# Patient Record
Sex: MALE | Race: WHITE | NOT HISPANIC OR LATINO | Employment: PART TIME | URBAN - METROPOLITAN AREA
[De-identification: names, ages, dates, MRNs, and addresses within clinical notes are randomized per-mention and may not be internally consistent; named-entity substitution may affect disease eponyms.]

---

## 2017-01-25 ENCOUNTER — HOSPITAL ENCOUNTER (OUTPATIENT)
Dept: ONCOLOGY | Facility: CLINIC | Age: 57
Discharge: HOME | End: 2017-01-25
Attending: INTERNAL MEDICINE | Admitting: INTERNAL MEDICINE

## 2017-02-22 ENCOUNTER — HOSPITAL ENCOUNTER (OUTPATIENT)
Dept: ONCOLOGY | Facility: CLINIC | Age: 57
Discharge: HOME | End: 2017-02-22
Attending: INTERNAL MEDICINE | Admitting: INTERNAL MEDICINE

## 2017-03-22 ENCOUNTER — HOSPITAL ENCOUNTER (OUTPATIENT)
Dept: ONCOLOGY | Facility: CLINIC | Age: 57
Discharge: HOME | End: 2017-03-22
Attending: INTERNAL MEDICINE | Admitting: INTERNAL MEDICINE

## 2017-03-22 LAB
ALBUMIN SERPL-MCNC: 3.6 G/DL (ref 3.5–4.8)
ALBUMIN/GLOB SERPL: 1.6 {RATIO} (ref 1–1.7)
ALP SERPL-CCNC: 80 IU/L (ref 32–91)
ALT SERPL-CCNC: 47 IU/L (ref 17–63)
ANION GAP SERPL CALC-SCNC: 14.3 MMOL/L (ref 10–20)
AST SERPL-CCNC: 38 IU/L (ref 15–41)
BILIRUB SERPL-MCNC: 0.6 MG/DL (ref 0.3–1.2)
BUN SERPL-MCNC: 17 MG/DL (ref 8–20)
BUN/CREAT SERPL: 18.9 (ref 6.2–20.3)
CALCIUM SERPL-MCNC: 9.1 MG/DL (ref 8.9–10.3)
CEA SERPL-MCNC: NORMAL NG/ML (ref 0–5)
CHLORIDE SERPL-SCNC: 105 MMOL/L (ref 101–111)
CONV CO2: 26 MMOL/L (ref 22–32)
CONV TOTAL PROTEIN: 5.8 G/DL (ref 6.1–7.9)
CREAT UR-MCNC: 0.9 MG/DL (ref 0.7–1.2)
FERRITIN SERPL-MCNC: 18 NG/ML (ref 24–336)
GLOBULIN UR ELPH-MCNC: 2.2 G/DL (ref 2.5–3.8)
GLUCOSE SERPL-MCNC: 84 MG/DL (ref 65–99)
POTASSIUM SERPL-SCNC: 4.3 MMOL/L (ref 3.6–5.1)
SODIUM SERPL-SCNC: 141 MMOL/L (ref 136–144)

## 2017-04-20 ENCOUNTER — HOSPITAL ENCOUNTER (OUTPATIENT)
Dept: ONCOLOGY | Facility: CLINIC | Age: 57
Discharge: HOME | End: 2017-04-20
Attending: INTERNAL MEDICINE | Admitting: INTERNAL MEDICINE

## 2017-04-25 ENCOUNTER — HOSPITAL ENCOUNTER (OUTPATIENT)
Dept: ONCOLOGY | Facility: CLINIC | Age: 57
Discharge: HOME | End: 2017-04-25
Attending: INTERNAL MEDICINE | Admitting: INTERNAL MEDICINE

## 2017-05-09 ENCOUNTER — HOSPITAL ENCOUNTER (OUTPATIENT)
Dept: GENERAL RADIOLOGY | Facility: HOSPITAL | Age: 57
Discharge: HOME | End: 2017-05-09
Attending: FAMILY MEDICINE | Admitting: FAMILY MEDICINE

## 2017-05-17 ENCOUNTER — HOSPITAL ENCOUNTER (OUTPATIENT)
Dept: ONCOLOGY | Facility: CLINIC | Age: 57
Discharge: HOME | End: 2017-05-17
Attending: INTERNAL MEDICINE | Admitting: INTERNAL MEDICINE

## 2017-06-14 ENCOUNTER — HOSPITAL ENCOUNTER (OUTPATIENT)
Dept: ONCOLOGY | Facility: CLINIC | Age: 57
Discharge: HOME | End: 2017-06-14
Attending: INTERNAL MEDICINE | Admitting: INTERNAL MEDICINE

## 2017-07-18 ENCOUNTER — HOSPITAL ENCOUNTER (OUTPATIENT)
Dept: ONCOLOGY | Facility: CLINIC | Age: 57
Discharge: HOME | End: 2017-07-18
Attending: NURSE PRACTITIONER | Admitting: NURSE PRACTITIONER

## 2017-07-18 LAB
ANION GAP SERPL CALC-SCNC: 12.3 MMOL/L (ref 10–20)
BUN SERPL-MCNC: 21 MG/DL (ref 8–20)
BUN/CREAT SERPL: 23.3 (ref 6.2–20.3)
CALCIUM SERPL-MCNC: 9.3 MG/DL (ref 8.9–10.3)
CHLORIDE SERPL-SCNC: 102 MMOL/L (ref 101–111)
CONV CO2: 27 MMOL/L (ref 22–32)
CREAT UR-MCNC: 0.9 MG/DL (ref 0.7–1.2)
GLUCOSE SERPL-MCNC: 106 MG/DL (ref 65–99)
POTASSIUM SERPL-SCNC: 4.3 MMOL/L (ref 3.6–5.1)
SODIUM SERPL-SCNC: 137 MMOL/L (ref 136–144)

## 2017-07-24 ENCOUNTER — HOSPITAL ENCOUNTER (OUTPATIENT)
Dept: ONCOLOGY | Facility: CLINIC | Age: 57
Discharge: HOME | End: 2017-07-24
Attending: INTERNAL MEDICINE | Admitting: INTERNAL MEDICINE

## 2017-08-21 ENCOUNTER — HOSPITAL ENCOUNTER (OUTPATIENT)
Dept: ONCOLOGY | Facility: CLINIC | Age: 57
Discharge: HOME | End: 2017-08-21
Attending: INTERNAL MEDICINE | Admitting: INTERNAL MEDICINE

## 2017-09-06 ENCOUNTER — HOSPITAL ENCOUNTER (OUTPATIENT)
Dept: GENERAL RADIOLOGY | Facility: HOSPITAL | Age: 57
Discharge: HOME | End: 2017-09-06
Attending: FAMILY MEDICINE | Admitting: FAMILY MEDICINE

## 2017-09-18 ENCOUNTER — HOSPITAL ENCOUNTER (OUTPATIENT)
Dept: ONCOLOGY | Facility: HOSPITAL | Age: 57
Discharge: HOME | End: 2017-09-18
Attending: INTERNAL MEDICINE | Admitting: INTERNAL MEDICINE

## 2017-09-18 ENCOUNTER — HOSPITAL ENCOUNTER (OUTPATIENT)
Dept: ONCOLOGY | Facility: CLINIC | Age: 57
Setting detail: INFUSION SERIES
Discharge: HOME | End: 2017-09-18
Attending: INTERNAL MEDICINE | Admitting: INTERNAL MEDICINE

## 2017-09-18 ENCOUNTER — CLINICAL SUPPORT (OUTPATIENT)
Dept: ONCOLOGY | Facility: HOSPITAL | Age: 57
End: 2017-09-18

## 2017-09-18 NOTE — PROGRESS NOTES
PATIENTS ONCOLOGY RECORD LOCATED IN [facility]      Subjective     Name:  MYRANDA SPRINGER     Date:  2017  Address:  [address] [place] [address]  Home: [phone]  :  1960 AGE:  57 y.o.        RECORDS OBTAINED:  Patients Oncology Record is located in [facility]

## 2017-10-18 ENCOUNTER — HOSPITAL ENCOUNTER (OUTPATIENT)
Dept: ONCOLOGY | Facility: CLINIC | Age: 57
Setting detail: INFUSION SERIES
Discharge: HOME | End: 2017-10-18
Attending: INTERNAL MEDICINE | Admitting: INTERNAL MEDICINE

## 2017-10-18 ENCOUNTER — CLINICAL SUPPORT (OUTPATIENT)
Dept: ONCOLOGY | Facility: HOSPITAL | Age: 57
End: 2017-10-18

## 2017-10-18 ENCOUNTER — HOSPITAL ENCOUNTER (OUTPATIENT)
Dept: ONCOLOGY | Facility: HOSPITAL | Age: 57
Discharge: HOME | End: 2017-10-18
Attending: INTERNAL MEDICINE | Admitting: INTERNAL MEDICINE

## 2017-10-18 NOTE — PROGRESS NOTES
PATIENTS ONCOLOGY RECORD LOCATED IN [facility]      Subjective     Name:  MYRANDA SPRINGER     Date:  10/18/2017  Address:  [address] [place] [address]  Home: [phone]  :  1960 AGE:  57 y.o.        RECORDS OBTAINED:  Patients Oncology Record is located in [facility]

## 2017-11-20 ENCOUNTER — HOSPITAL ENCOUNTER (OUTPATIENT)
Dept: ONCOLOGY | Facility: CLINIC | Age: 57
Setting detail: INFUSION SERIES
Discharge: HOME | End: 2017-11-20
Attending: INTERNAL MEDICINE | Admitting: INTERNAL MEDICINE

## 2017-11-20 ENCOUNTER — CLINICAL SUPPORT (OUTPATIENT)
Dept: ONCOLOGY | Facility: HOSPITAL | Age: 57
End: 2017-11-20

## 2017-11-20 ENCOUNTER — HOSPITAL ENCOUNTER (OUTPATIENT)
Dept: ONCOLOGY | Facility: HOSPITAL | Age: 57
Discharge: HOME | End: 2017-11-20
Attending: INTERNAL MEDICINE | Admitting: INTERNAL MEDICINE

## 2017-11-20 LAB
ALBUMIN SERPL-MCNC: 4 G/DL (ref 3.5–4.8)
ALBUMIN/GLOB SERPL: 1.6 {RATIO} (ref 1–1.7)
ALP SERPL-CCNC: 71 IU/L (ref 32–91)
ALT SERPL-CCNC: 26 IU/L (ref 17–63)
ANION GAP SERPL CALC-SCNC: 11.4 MMOL/L (ref 10–20)
AST SERPL-CCNC: 27 IU/L (ref 15–41)
BILIRUB SERPL-MCNC: 1 MG/DL (ref 0.3–1.2)
BUN SERPL-MCNC: 18 MG/DL (ref 8–20)
BUN/CREAT SERPL: 22.5 (ref 6.2–20.3)
CALCIUM SERPL-MCNC: 9.2 MG/DL (ref 8.9–10.3)
CEA SERPL-MCNC: NORMAL NG/ML (ref 0–5)
CHLORIDE SERPL-SCNC: 101 MMOL/L (ref 101–111)
CONV CO2: 27 MMOL/L (ref 22–32)
CONV TOTAL PROTEIN: 6.5 G/DL (ref 6.1–7.9)
CREAT UR-MCNC: 0.8 MG/DL (ref 0.7–1.2)
FERRITIN SERPL-MCNC: 48 NG/ML (ref 24–336)
GLOBULIN UR ELPH-MCNC: 2.5 G/DL (ref 2.5–3.8)
GLUCOSE SERPL-MCNC: 108 MG/DL (ref 65–99)
POTASSIUM SERPL-SCNC: 4.4 MMOL/L (ref 3.6–5.1)
SODIUM SERPL-SCNC: 135 MMOL/L (ref 136–144)

## 2017-11-20 NOTE — PROGRESS NOTES
PATIENTS ONCOLOGY RECORD LOCATED IN [facility]      Subjective     Name:  MYRANDA SPRINGER     Date:  2017  Address:  [address] [place] [address]  Home: [phone]  :  1960 AGE:  57 y.o.        RECORDS OBTAINED:  Patients Oncology Record is located in [facility]

## 2017-12-20 ENCOUNTER — HOSPITAL ENCOUNTER (OUTPATIENT)
Dept: ONCOLOGY | Facility: CLINIC | Age: 57
Setting detail: INFUSION SERIES
Discharge: HOME | End: 2017-12-20
Attending: INTERNAL MEDICINE | Admitting: INTERNAL MEDICINE

## 2017-12-20 ENCOUNTER — CLINICAL SUPPORT (OUTPATIENT)
Dept: ONCOLOGY | Facility: HOSPITAL | Age: 57
End: 2017-12-20

## 2017-12-20 ENCOUNTER — HOSPITAL ENCOUNTER (OUTPATIENT)
Dept: ONCOLOGY | Facility: HOSPITAL | Age: 57
Discharge: HOME | End: 2017-12-20
Attending: INTERNAL MEDICINE | Admitting: INTERNAL MEDICINE

## 2017-12-20 NOTE — PROGRESS NOTES
PATIENTS ONCOLOGY RECORD LOCATED IN [facility]      Subjective     Name:  MYRANDA SPRINGER     Date:  2017  Address:  [address] [place] [address]  Home: [phone]  :  1960 AGE:  57 y.o.        RECORDS OBTAINED:  Patients Oncology Record is located in [facility]

## 2018-01-15 ENCOUNTER — CLINICAL SUPPORT (OUTPATIENT)
Dept: ONCOLOGY | Facility: HOSPITAL | Age: 58
End: 2018-01-15

## 2018-01-15 ENCOUNTER — HOSPITAL ENCOUNTER (OUTPATIENT)
Dept: ONCOLOGY | Facility: CLINIC | Age: 58
Setting detail: INFUSION SERIES
Discharge: HOME | End: 2018-01-15
Attending: INTERNAL MEDICINE | Admitting: INTERNAL MEDICINE

## 2018-01-15 NOTE — PROGRESS NOTES
PATIENTS ONCOLOGY RECORD LOCATED IN [facility]      Subjective     Name:  MYRANDA SPRINGER     Date:  01/15/2018  Address:  [address] [place] [address]  Home: [phone]  :  1960 AGE:  58 y.o.        RECORDS OBTAINED:  Patients Oncology Record is located in [facility]

## 2018-02-19 ENCOUNTER — HOSPITAL ENCOUNTER (OUTPATIENT)
Dept: ONCOLOGY | Facility: CLINIC | Age: 58
Setting detail: INFUSION SERIES
Discharge: HOME | End: 2018-02-19
Attending: INTERNAL MEDICINE | Admitting: INTERNAL MEDICINE

## 2018-02-19 ENCOUNTER — CLINICAL SUPPORT (OUTPATIENT)
Dept: ONCOLOGY | Facility: HOSPITAL | Age: 58
End: 2018-02-19

## 2018-02-19 NOTE — PROGRESS NOTES
PATIENTS ONCOLOGY RECORD LOCATED IN [facility]      Subjective     Name:  MYRANDA SPRINGER     Date:  2018  Address:  [address] [place] [address]  Home: [phone]  :  1960 AGE:  58 y.o.        RECORDS OBTAINED:  Patients Oncology Record is located in [facility]

## 2018-03-19 ENCOUNTER — HOSPITAL ENCOUNTER (OUTPATIENT)
Dept: ONCOLOGY | Facility: HOSPITAL | Age: 58
Discharge: HOME | End: 2018-03-19
Attending: NURSE PRACTITIONER | Admitting: NURSE PRACTITIONER

## 2018-03-19 ENCOUNTER — CLINICAL SUPPORT (OUTPATIENT)
Dept: ONCOLOGY | Facility: HOSPITAL | Age: 58
End: 2018-03-19

## 2018-03-19 ENCOUNTER — HOSPITAL ENCOUNTER (OUTPATIENT)
Dept: ONCOLOGY | Facility: CLINIC | Age: 58
Setting detail: INFUSION SERIES
Discharge: HOME | End: 2018-03-19
Attending: NURSE PRACTITIONER | Admitting: NURSE PRACTITIONER

## 2018-03-19 NOTE — PROGRESS NOTES
PATIENTS ONCOLOGY RECORD LOCATED IN [facility]      Subjective     Name:  MYRANDA SPRINGER     Date:  2018  Address:  [address] [place] [address]  Home: [phone]  :  1960 AGE:  58 y.o.        RECORDS OBTAINED:  Patients Oncology Record is located in [facility]

## 2018-04-17 ENCOUNTER — HOSPITAL ENCOUNTER (OUTPATIENT)
Dept: ONCOLOGY | Facility: CLINIC | Age: 58
Setting detail: INFUSION SERIES
Discharge: HOME | End: 2018-04-17
Attending: INTERNAL MEDICINE | Admitting: INTERNAL MEDICINE

## 2018-04-17 ENCOUNTER — CLINICAL SUPPORT (OUTPATIENT)
Dept: ONCOLOGY | Facility: HOSPITAL | Age: 58
End: 2018-04-17

## 2018-04-17 NOTE — PROGRESS NOTES
PATIENTS ONCOLOGY RECORD LOCATED IN [facility]      Subjective     Name:  MYRANDA SPRINGER     Date:  2018  Address:  [address] [place] [address]  Home: [phone]  :  1960 AGE:  58 y.o.        RECORDS OBTAINED:  Patients Oncology Record is located in [facility]

## 2018-05-16 ENCOUNTER — HOSPITAL ENCOUNTER (OUTPATIENT)
Dept: ONCOLOGY | Facility: CLINIC | Age: 58
Setting detail: INFUSION SERIES
Discharge: HOME | End: 2018-05-16
Attending: INTERNAL MEDICINE | Admitting: INTERNAL MEDICINE

## 2018-05-16 ENCOUNTER — CLINICAL SUPPORT (OUTPATIENT)
Dept: ONCOLOGY | Facility: HOSPITAL | Age: 58
End: 2018-05-16

## 2018-05-16 NOTE — PROGRESS NOTES
PATIENTS ONCOLOGY RECORD LOCATED IN [facility]      Subjective     Name:  MYRANDA SPRINGER     Date:  2018  Address:  [address] [place] [address]  Home: [phone]  :  1960 AGE:  58 y.o.        RECORDS OBTAINED:  Patients Oncology Record is located in [facility]

## 2018-06-19 ENCOUNTER — HOSPITAL ENCOUNTER (OUTPATIENT)
Dept: ONCOLOGY | Facility: CLINIC | Age: 58
Setting detail: INFUSION SERIES
Discharge: HOME | End: 2018-06-19
Attending: INTERNAL MEDICINE | Admitting: INTERNAL MEDICINE

## 2018-06-19 ENCOUNTER — CLINICAL SUPPORT (OUTPATIENT)
Dept: ONCOLOGY | Facility: HOSPITAL | Age: 58
End: 2018-06-19

## 2018-06-19 NOTE — PROGRESS NOTES
PATIENTS ONCOLOGY RECORD LOCATED IN [facility]      Subjective     Name:  MYRANDA SPRINGER     Date:  2018  Address:  [address] [place] [address]  Home: [phone]  :  1960 AGE:  58 y.o.        RECORDS OBTAINED:  Patients Oncology Record is located in [facility]

## 2018-07-16 ENCOUNTER — HOSPITAL ENCOUNTER (OUTPATIENT)
Dept: ONCOLOGY | Facility: CLINIC | Age: 58
Setting detail: INFUSION SERIES
Discharge: HOME | End: 2018-07-16
Attending: INTERNAL MEDICINE | Admitting: INTERNAL MEDICINE

## 2018-07-16 ENCOUNTER — HOSPITAL ENCOUNTER (OUTPATIENT)
Dept: ONCOLOGY | Facility: HOSPITAL | Age: 58
Discharge: HOME | End: 2018-07-16
Attending: INTERNAL MEDICINE | Admitting: INTERNAL MEDICINE

## 2018-07-16 ENCOUNTER — CLINICAL SUPPORT (OUTPATIENT)
Dept: ONCOLOGY | Facility: HOSPITAL | Age: 58
End: 2018-07-16

## 2018-07-16 LAB
ALBUMIN SERPL-MCNC: 4 G/DL (ref 3.5–4.8)
ALBUMIN/GLOB SERPL: 1.4 {RATIO} (ref 1–1.7)
ALP SERPL-CCNC: 70 IU/L (ref 32–91)
ALT SERPL-CCNC: 20 IU/L (ref 17–63)
ANION GAP SERPL CALC-SCNC: 11.2 MMOL/L (ref 10–20)
AST SERPL-CCNC: 27 IU/L (ref 15–41)
BILIRUB SERPL-MCNC: 0.6 MG/DL (ref 0.3–1.2)
BUN SERPL-MCNC: 18 MG/DL (ref 8–20)
BUN/CREAT SERPL: 20 (ref 6.2–20.3)
CALCIUM SERPL-MCNC: 9.4 MG/DL (ref 8.9–10.3)
CHLORIDE SERPL-SCNC: 102 MMOL/L (ref 101–111)
CONV CO2: 28 MMOL/L (ref 22–32)
CONV TOTAL PROTEIN: 6.8 G/DL (ref 6.1–7.9)
CREAT UR-MCNC: 0.9 MG/DL (ref 0.7–1.2)
GLOBULIN UR ELPH-MCNC: 2.8 G/DL (ref 2.5–3.8)
GLUCOSE SERPL-MCNC: 82 MG/DL (ref 65–99)
POTASSIUM SERPL-SCNC: 4.2 MMOL/L (ref 3.6–5.1)
SODIUM SERPL-SCNC: 137 MMOL/L (ref 136–144)

## 2018-07-16 NOTE — PROGRESS NOTES
PATIENTS ONCOLOGY RECORD LOCATED IN [facility]      Subjective     Name:  MYRANDA SPRINGER     Date:  2018  Address:  [address] [place] [address]  Home: [phone]  :  1960 AGE:  58 y.o.        RECORDS OBTAINED:  Patients Oncology Record is located in [facility]

## 2018-08-13 ENCOUNTER — HOSPITAL ENCOUNTER (OUTPATIENT)
Dept: ONCOLOGY | Facility: CLINIC | Age: 58
Setting detail: INFUSION SERIES
Discharge: HOME | End: 2018-08-13
Attending: INTERNAL MEDICINE | Admitting: INTERNAL MEDICINE

## 2018-08-13 ENCOUNTER — CLINICAL SUPPORT (OUTPATIENT)
Dept: ONCOLOGY | Facility: HOSPITAL | Age: 58
End: 2018-08-13

## 2018-08-13 NOTE — PROGRESS NOTES
PATIENTS ONCOLOGY RECORD LOCATED IN [facility]      Subjective     Name:  MYRANDA SPRINGER     Date:  2018  Address:  [address] [place] [address]  Home: [phone]  :  1960 AGE:  58 y.o.        RECORDS OBTAINED:  Patients Oncology Record is located in [facility]

## 2018-09-04 ENCOUNTER — CONVERSION ENCOUNTER (OUTPATIENT)
Dept: SURGERY | Facility: CLINIC | Age: 58
End: 2018-09-04

## 2018-09-04 ENCOUNTER — HOSPITAL ENCOUNTER (OUTPATIENT)
Dept: CT IMAGING | Facility: HOSPITAL | Age: 58
Discharge: HOME | End: 2018-09-04
Attending: THORACIC SURGERY (CARDIOTHORACIC VASCULAR SURGERY) | Admitting: THORACIC SURGERY (CARDIOTHORACIC VASCULAR SURGERY)

## 2018-09-04 LAB — CREAT BLDA-MCNC: 0.9 MG/DL (ref 0.6–1.3)

## 2018-09-10 ENCOUNTER — HOSPITAL ENCOUNTER (OUTPATIENT)
Dept: PREOP | Facility: HOSPITAL | Age: 58
Setting detail: HOSPITAL OUTPATIENT SURGERY
Discharge: HOME | End: 2018-09-10
Attending: THORACIC SURGERY (CARDIOTHORACIC VASCULAR SURGERY) | Admitting: THORACIC SURGERY (CARDIOTHORACIC VASCULAR SURGERY)

## 2018-09-10 LAB
BASOPHILS # BLD AUTO: 0 10*3/UL (ref 0–0.2)
BASOPHILS NFR BLD AUTO: 1 % (ref 0–2)
DIFFERENTIAL METHOD BLD: (no result)
EOSINOPHIL # BLD AUTO: 0.2 10*3/UL (ref 0–0.3)
EOSINOPHIL # BLD AUTO: 4 % (ref 0–3)
ERYTHROCYTE [DISTWIDTH] IN BLOOD BY AUTOMATED COUNT: 13.6 % (ref 11.5–14.5)
HCT VFR BLD AUTO: 47.2 % (ref 40–54)
HGB BLD-MCNC: 15.6 G/DL (ref 14–18)
LYMPHOCYTES # BLD AUTO: 0.9 10*3/UL (ref 0.8–4.8)
LYMPHOCYTES NFR BLD AUTO: 22 % (ref 18–42)
MCH RBC QN AUTO: 29.2 PG (ref 26–32)
MCHC RBC AUTO-ENTMCNC: 33 G/DL (ref 32–36)
MCV RBC AUTO: 88.3 FL (ref 80–94)
MONOCYTES # BLD AUTO: 0.6 10*3/UL (ref 0.1–1.3)
MONOCYTES NFR BLD AUTO: 14 % (ref 2–11)
NEUTROPHILS # BLD AUTO: 2.3 10*3/UL (ref 2.3–8.6)
NEUTROPHILS NFR BLD AUTO: 59 % (ref 50–75)
NRBC BLD AUTO-RTO: 0 /100{WBCS}
NRBC/RBC NFR BLD MANUAL: 0 10*3/UL
PLATELET # BLD AUTO: 253 10*3/UL (ref 150–450)
PMV BLD AUTO: 7.5 FL (ref 7.4–10.4)
RBC # BLD AUTO: 5.34 10*6/UL (ref 4.6–6)
WBC # BLD AUTO: 3.9 10*3/UL (ref 4.5–11.5)

## 2018-09-18 ENCOUNTER — CONVERSION ENCOUNTER (OUTPATIENT)
Dept: SURGERY | Facility: CLINIC | Age: 58
End: 2018-09-18

## 2018-11-19 ENCOUNTER — HOSPITAL ENCOUNTER (OUTPATIENT)
Dept: ONCOLOGY | Facility: CLINIC | Age: 58
Setting detail: INFUSION SERIES
Discharge: HOME | End: 2018-11-19
Attending: NURSE PRACTITIONER | Admitting: NURSE PRACTITIONER

## 2018-11-19 ENCOUNTER — CLINICAL SUPPORT (OUTPATIENT)
Dept: ONCOLOGY | Facility: HOSPITAL | Age: 58
End: 2018-11-19

## 2018-11-19 NOTE — PROGRESS NOTES
PATIENTS ONCOLOGY RECORD LOCATED IN [facility]      Subjective     Name:  MYRANDA SPRINGER     Date:  2018  Address:  [address] [place] [address] [place] [facility]  Home: [unfilled]  :  1960 AGE:  58 y.o.        RECORDS OBTAINED:  Patients Oncology Record is located in [facility]

## 2019-03-18 ENCOUNTER — HOSPITAL ENCOUNTER (OUTPATIENT)
Dept: ONCOLOGY | Facility: CLINIC | Age: 59
Setting detail: INFUSION SERIES
Discharge: HOME | End: 2019-03-18
Attending: INTERNAL MEDICINE | Admitting: INTERNAL MEDICINE

## 2019-03-18 ENCOUNTER — CLINICAL SUPPORT (OUTPATIENT)
Dept: ONCOLOGY | Facility: HOSPITAL | Age: 59
End: 2019-03-18

## 2019-03-18 NOTE — PROGRESS NOTES
PATIENTS ONCOLOGY RECORD LOCATED IN [facility]      Subjective     Name:  MYRANDA SPRINGER     Date:  2019  Address:  [address] [place] [address] [place] [facility]  Home: [unfilled]  :  1960 AGE:  59 y.o.        RECORDS OBTAINED:  Patients Oncology Record is located in [facility]

## 2019-06-04 VITALS
OXYGEN SATURATION: 97 % | DIASTOLIC BLOOD PRESSURE: 78 MMHG | SYSTOLIC BLOOD PRESSURE: 118 MMHG | HEART RATE: 64 BPM | WEIGHT: 221 LBS | OXYGEN SATURATION: 97 % | DIASTOLIC BLOOD PRESSURE: 84 MMHG | WEIGHT: 217 LBS | HEIGHT: 72 IN | HEIGHT: 72 IN | BODY MASS INDEX: 29.93 KG/M2 | HEART RATE: 56 BPM | BODY MASS INDEX: 29.39 KG/M2 | SYSTOLIC BLOOD PRESSURE: 125 MMHG

## 2019-08-13 ENCOUNTER — TELEPHONE (OUTPATIENT)
Dept: ONCOLOGY | Facility: CLINIC | Age: 59
End: 2019-08-13

## 2019-08-14 ENCOUNTER — TRANSCRIBE ORDERS (OUTPATIENT)
Dept: PET IMAGING | Facility: HOSPITAL | Age: 59
End: 2019-08-14

## 2019-08-14 DIAGNOSIS — C15.4 MALIGNANT NEOPLASM OF THORACIC ESOPHAGUS (HCC): Primary | ICD-10-CM

## 2019-08-14 DIAGNOSIS — L60.8 MEDIAN NAIL DYSTROPHY: ICD-10-CM

## 2019-08-14 DIAGNOSIS — D69.6 THROMBOCYTOPENIA, UNSPECIFIED (HCC): ICD-10-CM

## 2019-08-14 DIAGNOSIS — D50.9 IRON DEFICIENCY ANEMIA, UNSPECIFIED IRON DEFICIENCY ANEMIA TYPE: ICD-10-CM

## 2019-08-20 DIAGNOSIS — C15.5 PRIMARY ADENOCARCINOMA OF DISTAL THIRD OF ESOPHAGUS (HCC): Primary | ICD-10-CM

## 2019-09-16 ENCOUNTER — APPOINTMENT (OUTPATIENT)
Dept: LAB | Facility: HOSPITAL | Age: 59
End: 2019-09-16

## 2019-09-16 ENCOUNTER — APPOINTMENT (OUTPATIENT)
Dept: ONCOLOGY | Facility: CLINIC | Age: 59
End: 2019-09-16

## 2019-09-16 PROBLEM — C15.9 ADENOCARCINOMA OF ESOPHAGUS: Chronic | Status: ACTIVE | Noted: 2019-09-16

## 2019-09-16 PROBLEM — C15.9 ADENOCARCINOMA OF ESOPHAGUS (HCC): Status: ACTIVE | Noted: 2019-09-16

## 2019-09-16 NOTE — PROGRESS NOTES
Hematology/Oncology Outpatient Follow Up    PATIENT NAME:Americo Reyes  :1960  MRN: [id number]  PRIMARY CARE PHYSICIAN: Rebeka Clark MD  REFERRING PHYSICIAN: Rebeka Clark MD    Chief Complaint   Patient presents with   • Follow-up     Adenocarcinoma distal esophagus Stage IIIA        HISTORY OF PRESENT ILLNESS:   1. Adenocarcinoma of the esophagus clinical Stage (T3, N1) IIIA diagnosed 2014.  • He claimed not to have been symptomatic at all, but was due for a colonoscopy.  He saw his family physician, Rebeka Clark M.D., and an EGD was performed also as he has a history of gastroesophageal reflux disease.  The patient agreed and was referred to Nik Mcnally M.D.  On 2014, an EGD and colonoscopy with biopsy were performed by Dr. Mcnally, revealing a 5 cm distal esophageal malignant appearing mass, three polyps were removed at the time of colonoscopy, repeat colonoscopy in five years.  Pathology revealed moderately differentiated adenocarcinoma in the esophageal mass and the cecal polyp had lymphoid aggregate and ascending colon polyp was a tubular adenoma.  A CEA level that day was 1.49 mg/ml (less than 3.8 nonsmokers).  The patient underwent a CT scan of the chest on 2014 revealing no active lung disease, no mediastinal lymphadenopathy and esophageal wall thickening.  A CT scan of the abdomen and pelvis was performed at the same day revealing small gallstone.  The patient was then referred to Tamir Schultz M.D. and myself and has an appointment to be seen by Dr. Schultz on 2014.  The patient is complaining of having had a scratchy throat since his EGD, but before that was not having any throat pain, dysphagia, nausea, abdominal pain or weight loss.  • 14 - Patient was seen in initial consultation.  • 14 - Orders written to start chemotherapy along with radiation therapy consisting of 5-FU 1000 mg/m² CIV days 1 through 4 starting on days 1, 22,  43, 64, and 92.  Cisplatin 50 mg/m² IV over one hour days 1 through 5 starting on the same days as 5-FU.  • 12/12/14 - CEA 2.7 (N)  • 12/18/14 - Initial consult by Dr. Triplett in radiation oncology--plan for total dose of 5040 cGy in 28 fractions.   • 12/19/14 - Port placement by Dr. Schultz.   • 1/5/15 - Cycle 1 of chemotherapy and radiation therapy - (Cisplatin and 5-FU along with radiation)  • 1/26/15 - Hold chemotherapy due to neutropenia.  WBC 1.86, ANC 0.85  • 2/2/15--Cycle 2 chemotherapy consisting of 5-FU and Cisplatin.  XRT continuing. Order written to reduce chemotherapy by 20% due to significant neutropenia in Cycle 1.   • 2/11/15 - Radiation therapy completed - Total dose 4500 cGy in 25 fractions followed by an additional boost of 540 cGy in 3 fractions to the primary and penelope gross disease.  • 2/11/15 - Order written to start Cycle 3 of chemotherapy on 2/23/15 and increase dose to 100% of original order and add Neulasta as prophylaxis of febrile neutropenia.  • 2/18/15 - CEA 1.7 (N)  • 2/23/15 - Cycle 3 Day 1 chemotherapy held due to low counts.  ANC 0.97, WBC 2.04  • 3/2/15 - Cycle 3 chemotherapy started with 5-FU and Cisplatin. Planned to be last cycle before surgery.  • 3/30/15 - PET scan revealed there had been interval resolution of the activity seen involving the distal esophagus since prior examination.  The lymph nodes noted previously adjacent to the esophagus was no longer present.  There did not appear to be significant disease throughout the head/neck, chest, abdomen, or pelvis to suggest metastatic disease at this point.    • 4/3/15 - Esophagogastroscopy by Dr. Schultz revealing nodularity, focal at 40 cm.  No pathology testing was obtained.    • 4/7/15 - Discussed with patient that additional chemotherapy may or may not be needed and decision will be made after pathology received from impending surgery.  • 4/13/15 - The patient underwent Daren-Guanaco esophagogastrectomy with two field  lymphadenectomy, feeding jejunostomy and intercostal muscle flap, coverage of the esophagogastric anastomosis by Dr. Schultz. Liver pathology revealed benign liver and fibrous tissue. Lower esophageal lymph node showed three benign lymph nodes and benign adipose tissue.  Subcarinal lymph node was benign. Esophagus and stomach, partial esophagogastrectomy: invasive adenocarcinoma. Invasive adenocarcinoma. Tumor Grade: moderately differentiated.  Tumor size: Grossly the area of tumor measures 2.1x2.0 x1.4cm but the largest focus of confluent tumor on the side was 1.2cm.  The tumor appears to be approximately 30-40% viable. Margins of tumor extends into the submucosa but not into the muscularis propria. No capillary lymphatic invasion was identified.  No perineural invasion was identified.  Proximal and distal margins were free of tumor. The closet margin was the 5.0cm away. The circumferential margin was free of tumor. Tumor comes within 1.5cm of closet margin the circumferential margin.  The central portion of the tumor is located within the esophagus approximately 1.1cm above the esophagogastric junction.  Lymph nodes: Ten lymph nodes are identified with no evidence of metastatic disease. Jonas’s esophagus with high grade dysplasia well free of proximal and distal margins. Inferior pulmonary vein lymph nodes were benign adipose tissue.  Upper esophageal lymphatic’s showed four benign lymph nodes. Left mainstem showed fragments of a benign lymph node.  Final mucosal margin showed benign squamous mucosa and underlying connective tissue with focal chronic inflammation.  Pathologic staging [id number],No,Mx,G2. Peritoneal fluid was negative for malignant cells.  Benign mesothelial cells and inflammatory cells were identified.    • 5/7/15 - ALT 37 (10-47), AST 41 (11-38).  Decision made to observe patient without any further treatments.    • 6/4/15 - IgA 83 (), IgG 819 (700-1600), IgM 25 ().    • 7/1/15 - CEA  1.5 (N).  Chest x-ray with small right peratracheal density. CEA 1.5 (0-5). Magnesium 2 (1.8-2.5). Comprehensive metabolic panel with no significant abnormalities.   • 7/8/15 - CT of the chest status post esophagogastrectomy with course ponds to the anastomotic site.  Two tiny left upper lobe pulmonary nodules.  CT scan chest status post esophagogastrectomy with an area of abnormal upper right mediastinal contour that corresponds to anastomotic site, greater than expected soft tissue opacity, wall thickening of the stomach at the anastomosis, two tiny left upper lobe pulmonary nodules.      • 8/30/15 - PET scan with very mildly increased activity in the right side of the superior mediastinum with uptake of 2.1 with some increased activity in the right chest wall.   • 10/7/15 - EGD by Nik Mcnally M.D. revealed anatomy consistent with distal esophagectomy with gastric pull-up. Suture visible at the esophagogastric anastomosis with no obvious recurrent neoplastic lesion. There was a small amount of retained vegetable matter in the antrum. Pathology revealed fragments of benign junctional type mucosa with focal ulceration, active inflammation, granulation tissue response, and reactive/regenerative epithelial changes. No intestinal metaplasia, epithelial dysplasia or malignancy was identified.   • 11/17/15 - Comprehensive metabolic panel with no significant abnormality. CEA 1.6 (0-5).    • 3/18/16 - CT chest with contrast revealed previous partial esophageal resection with gastric pull-through procedure with no recurrent mass evident. CT of the abdomen and pelvis essentially negative.   • 6/17/16 - CEA 2.1 (0-5). Comprehensive metabolic panel with no significant abnormality.    • 9/6/16 - CT abdomen and pelvis with no evidence of metastatic disease. Postsurgical changes in the upper abdomen. Colonic diverticulosis without evidence of acute diverticulitis. Cholelithiasis. Fat-containing left inguinal hernia. CT chest  with postsurgical changes with chronic scarring.   • 9/19/16 - Patient complains of cough at night.   • 10/26/16 - EGD at [facility] by Nik Mcnally M.D. revealed widely patent gastroesophageal junction anastomosis with no evidence of recurrence. Semi-pedunculated 1 cm inflammatory gastric polyp just below the anastomosis. Pathology revealed hyperplastic polyp with surface ulceration and acute inflammation.   • 3/22/17 - Patient wants to keep the Infusaport in place. CEA 1.7 (N).   • 4/20/17 - CT abdomen and pelvis with contrast compared to 7/8/15 chest CT showed postop changes from partial esophagectomy and gastrectomy. No evidence of metastatic disease. Mild enlargement of prostate gland. Mild diffuse urinary bladder wall thickening related to underdistention. Greater bladder lateral with obstruction or cystitis. Small fat-containing umbilical and left inguinal hernias, degenerative changes of L5 and S1. CT chest with contrast compared to CT chest 7/8/15 showed no evidence of recurrent or metastatic disease in the chest. There was new patchy ground glass opacity in the anterior right lower lobe likely infectious or inflammatory, stable sub 4 mm pulmonary nodules in the left upper lobe, stable soft tissue thickening at the anterior aspect of the surgical anastomosis likely postop. Levaquin 500 mg p.o. daily x10 days for pneumonia on CT scan.   • 7/18/17 - Chest x-ray ordered for pneumonia follow-up and report of cough. Claritin 10 mg p.o. every h.s. prescribed.   • 7/24/17 - Stool Hemoccult negative x3.   • 9/6/17 - Chest x-ray with no acute process identified. Postoperative changes of prior esophagectomy and gastric pull-through procedure.   • 11/20/17 - CEA 1.9 (N). Ferritin 48 (N).   • 12/20/17 - CT chest, abdomen and pelvis with contrast showed development of mild focal area of ground glass density and mild pleural thickening in the right lower chest seen, mostly likely representing old post  inflammatory change. No evidence of adenopathy or recurrent mass. CT of the abdomen and pelvis remains stable. No development of metastatic or primary disease. Changes of cholelithiasis, diverticulosis and a left inguinal hernia remain stable as is change of gastric swing procedure for esophagectomy.   • 7/16/18 - CEA 2.0 (0-3).   • 9/4/18 - CT chest and abdomen ordered by Dr. Schultz negative for recurrence or metastatic disease within the chest or abdomen. There were scattered ground glass densities bilaterally, most prominent in the right lower lobe, suggesting infectious or inflammatory process. Cholelithiasis was present.   • 9/10/18 - Patient underwent EGD and biopsy of gastric polyp as well as removal of Infusaport by Dr. Schultz. EGD showed gastric polyp pathology revealing acutely inflamed and edematous submucosal granulation tissue, reactive gastropathy with congestion and focal foveolar hyperplasia, negative for H. Pylori. Recommended repeat EGD in nine months to one year.  · 9/17/2019-CT chest, abdomen, and pelvis showed new small noncalcified nodular densities in both lungs.  Benign infectious-inflammatory etiology was favored.  They there was no evidence of recurrence/metastatic disease in abdomen or pelvis.  · 9/18/2019- Augmentin prescribed.      2. Leukopenia diagnosed July 2015 and iron deficiency anemia diagnosed September 2016.   • 7/1/15 - WBC 3.3, 73% granulocytes, 19% lymphocytes, 8% monocytes, hemoglobin 13.5, platelet count 250,000.  AGA negative. B12 of 331 (180-914). Folate 21.1 (5.9-24.8).   • 7/20/15 - MMA of 90 ().   • 9/19/16 - WBC 8.9, hemoglobin 13.3, MCV 78.5, platelet count 198,000. Ferritin 17 (L), iron 34 (L),  (H), iron saturation 7 (L).   • 9/26/16 - Orders written to start Ferrous Sulfate 325 mg by mouth daily.   • 12/21/16 - WBC 3.9, hemoglobin 15.2, MCV 82.5, platelets 163,000, ANC 2.04. Orders written to continue Iron Sulfate 325 mg daily.   • 3/22/17 - Told  patient to continue Ferrous Sulfate and depending on today’s CBC results may stop it at next visit.   • 4/25/17 - UA performed for bladder wall thickening seen on CT abdomen and pelvis. UA showed spec gravity 1.030 and trace heme. Additional urinalysis was normal. Urine culture negative for infection.     Past Medical History:   Diagnosis Date   • Arthritis 2011   • Elevated triglycerides with high cholesterol 2005   • GERD (gastroesophageal reflux disease) 2003       Past Surgical History:   Procedure Laterality Date   • CYST REMOVAL Right 2007    benign on right thigh   • VARICOSE VEIN SURGERY Left 2006         Current Outpatient Medications:   •  amoxicillin-clavulanate (AUGMENTIN) 875-125 MG per tablet, Take 1 tablet by mouth 2 (Two) Times a Day for 7 days., Disp: 14 tablet, Rfl: 0  •  aspirin 81 MG tablet, ASPIRIN 81 MG ORAL TABLET, Disp: , Rfl:   •  niacin (NIACIN SR,NIASPAN ER) 500 MG CR capsule, Daily., Disp: , Rfl:   •  atorvastatin (LIPITOR) 20 MG tablet, TK 1 T PO QHS, Disp: , Rfl: 0  •  metoclopramide (REGLAN) 5 MG tablet, Take 1 tablet by mouth every night at bedtime., Disp: 30 tablet, Rfl: 1  •  omeprazole (priLOSEC) 40 MG capsule, TK 1 C PO ONCE DAILY, Disp: , Rfl: 1    No Known Allergies    Family History   Problem Relation Age of Onset   • Cancer Father         bladder       Cancer-related family history includes Cancer in his father.    Social History     Tobacco Use   • Smoking status: Never Smoker   • Smokeless tobacco: Never Used   Substance Use Topics   • Alcohol use: No     Frequency: Never   • Drug use: No       I have reviewed the history of present illness, past medical history, family history, social history, lab results, all notes and other records since the patient was last seen on 3/18/2019.    SUBJECTIVE:  Patient states that he wakes up in the middle of the night with a cough and feels like his throat is burning.  He usually wakes up and eats ice cream or ice chips for the burning in  "his throat and it helps.  His bed is propped up and he sleeps propped up. He states that he has not had an EGD in awhile. He states that he had his port removed and he is back in the [employer]. He states that spicy foods bothers his stomach. He states that he is active and denies any pain.   Simona Lopez CMA ([facility]AMA) was present during office visit.           REVIEW OF SYSTEMS:  Review of Systems   Constitutional: Negative for activity change, appetite change, chills, diaphoresis, fatigue, fever and unexpected weight change.   HENT: Positive for sore throat (burning in throat. ). Negative for congestion, mouth sores, nosebleeds and rhinorrhea.    Eyes: Negative.    Respiratory: Positive for cough (at night). Negative for chest tightness, shortness of breath and wheezing.    Cardiovascular: Negative for chest pain, palpitations and leg swelling.   Gastrointestinal: Negative for abdominal pain, blood in stool, constipation, diarrhea, nausea and vomiting.   Endocrine: Negative for cold intolerance and heat intolerance.   Genitourinary: Negative for difficulty urinating, dysuria and hematuria.   Musculoskeletal: Negative for arthralgias and joint swelling.   Skin: Negative for color change, rash and wound.   Neurological: Negative for dizziness, tremors, weakness, numbness and headaches.   Hematological: Negative for adenopathy. Does not bruise/bleed easily.   Psychiatric/Behavioral: Negative for agitation and confusion. The patient is nervous/anxious.    All other systems reviewed and are negative.      OBJECTIVE:    Vitals:    09/19/19 0831   BP: 130/80   Pulse: 63   Resp: 20   Temp: 97.8 °F (36.6 °C)   Weight: 101 kg (223 lb 6.4 oz)   Height: 182.9 cm (72\")   PainSc: 0-No pain       ECOG  (1) Restricted in physically strenuous activity, ambulatory and able to do work of light nature    Physical Exam   Constitutional: He is oriented to person, place, and time. He appears well-developed and well-nourished. No " distress.   Alone.    HENT:   Head: Normocephalic and atraumatic.   Nose: Nose normal.   Mouth/Throat: Oropharynx is clear and moist. No oropharyngeal exudate.   Dental fillings   Eyes: Conjunctivae, EOM and lids are normal. Pupils are equal, round, and reactive to light. Right eye exhibits no discharge. Left eye exhibits no discharge. No scleral icterus.   Neck: Normal range of motion. Neck supple. No thyromegaly present.   Cardiovascular: Normal rate, regular rhythm, normal heart sounds and intact distal pulses.   No murmur heard.  Pulmonary/Chest: Effort normal. No stridor. No respiratory distress. He has no wheezes. He has rales ( Few rhonchi bilateral bases).   Abdominal: Soft. Normal appearance and bowel sounds are normal. He exhibits no distension and no mass. There is no tenderness. There is no rebound and no guarding.   Genitourinary:   Genitourinary Comments: Deferred.   Musculoskeletal: Normal range of motion. He exhibits no edema or deformity.   Lymphadenopathy:     He has no cervical adenopathy.     He has no axillary adenopathy.        Right: No supraclavicular adenopathy present.        Left: No supraclavicular adenopathy present.   Neurological: He is alert and oriented to person, place, and time. Coordination normal.   Skin: Skin is warm and dry. Capillary refill takes less than 2 seconds. No bruising, no petechiae and no rash noted. He is not diaphoretic. No erythema. No pallor.   Psychiatric: He has a normal mood and affect. His speech is normal and behavior is normal. Judgment and thought content normal. Cognition and memory are normal.   Nursing note and vitals reviewed.      RECENT LABS  WBC   Date Value Ref Range Status   09/19/2019 4.26 3.40 - 10.80 10*3/mm3 Final     RBC   Date Value Ref Range Status   09/19/2019 5.40 4.14 - 5.80 10*6/mm3 Final     Hemoglobin   Date Value Ref Range Status   09/19/2019 15.7 13.0 - 17.7 g/dL Final     Hematocrit   Date Value Ref Range Status   09/19/2019 46.4  37.5 - 51.0 % Final     MCV   Date Value Ref Range Status   09/19/2019 85.9 79.0 - 97.0 fL Final     MCH   Date Value Ref Range Status   09/19/2019 29.1 26.6 - 33.0 pg Final     MCHC   Date Value Ref Range Status   09/19/2019 33.8 31.5 - 35.7 g/dL Final     RDW   Date Value Ref Range Status   09/19/2019 14.3 12.3 - 15.4 % Final     RDW-SD   Date Value Ref Range Status   09/19/2019 44.5 37.0 - 54.0 fl Final     MPV   Date Value Ref Range Status   09/19/2019 9.4 6.0 - 12.0 fL Final     Platelets   Date Value Ref Range Status   09/19/2019 199 140 - 450 10*3/mm3 Final     Neutrophil %   Date Value Ref Range Status   09/19/2019 55.6 42.7 - 76.0 % Final     Lymphocyte %   Date Value Ref Range Status   09/19/2019 22.1 19.6 - 45.3 % Final     Monocyte %   Date Value Ref Range Status   09/19/2019 16.7 (H) 5.0 - 12.0 % Final     Eosinophil %   Date Value Ref Range Status   09/19/2019 4.7 0.3 - 6.2 % Final     Basophil %   Date Value Ref Range Status   09/19/2019 0.9 0.0 - 1.5 % Final     Neutrophils, Absolute   Date Value Ref Range Status   09/19/2019 2.37 1.70 - 7.00 10*3/mm3 Final     Lymphocytes, Absolute   Date Value Ref Range Status   09/19/2019 0.94 0.70 - 3.10 10*3/mm3 Final     Monocytes, Absolute   Date Value Ref Range Status   09/19/2019 0.71 0.10 - 0.90 10*3/mm3 Final     Eosinophils, Absolute   Date Value Ref Range Status   09/19/2019 0.20 0.00 - 0.40 10*3/mm3 Final     Basophils, Absolute   Date Value Ref Range Status   09/19/2019 0.04 0.00 - 0.20 10*3/mm3 Final     nRBC   Date Value Ref Range Status   09/10/2018 0 0 /100[WBCs] Final       Lab Results   Component Value Date    GLUCOSE 82 07/16/2018    BUN 18 07/16/2018    CREATININE 0.9 07/16/2018    EGFRIFAFRI >60 09/04/2018    BCR 20.0 07/16/2018    K 4.2 07/16/2018    CO2 28 07/16/2018    CALCIUM 9.4 07/16/2018    ALBUMIN 4.0 07/16/2018    LABIL2 1.4 07/16/2018    AST 27 07/16/2018    ALT 20 07/16/2018         Assessment:    Adenocarcinoma distal esophagus  stage IIIA  - Comprehensive Metabolic Panel  - CBC & Differential  - Comprehensive Metabolic Panel  - CBC & Differential  - CBC Auto Differential  - FL Video Swallow With Speech  - CT chest w contrast  - CT abdomen pelvis w contrast  - Ambulatory Referral to Thoracic Surgery    Gastroesophageal reflux disease, esophagitis presence not specified    Abnormal CT of the chest with questionable aspiration    The patient has done well since his last visit with the exception of GERD symptoms most often experienced at night.  He is on omeprazole daily.  He has a bed that allows him to elevate the head in the evenings and reports compliance with this.  His CT scan of the chest is worrisome for possible aspiration.  He is due now for follow-up EGD with Dr. Schultz and I will get that scheduled.  I will also check a swallow study on him.  I discussed the use of Reglan at bedtime to see if this does not alleviate some of the symptoms as well.  I have educated him regarding the risk of aspiration and long-term complications with chronic aspiration.  His CBC does not reveal any abnormalities.  I will check a CMP on him today.    PLAN:  Schedule EGD with Dr. Schultz.  Swallow Study.   Reglan 5 mg by mouth at bedtime escribed to pharmacy.  Continue omeprazole.  Repeat CT scans prior to next appointment in 6 months.        I have reviewed labs results, imaging, vitals, and medications with the patient today and have reviewed information entered by Simona Lopez CMA ([facility]).   Will follow up in six months with the physician with a CBC at that time.      Patient verbalized understanding and is in agreement of the above plan.    Electronically signed by TRISH Ace, 09/19/19, 9:17 AM.    Much of the above report is an electronic transcription/translation of the spoken language to printed text using Dragon Software. As such, the subtleties and finesse of the spoken language may permit erroneous, or at times, nonsensical words or  phrases to be inadvertently transcribed; thus changes may be made at a later date to rectify these errors.

## 2019-09-18 ENCOUNTER — TELEPHONE (OUTPATIENT)
Dept: ONCOLOGY | Facility: CLINIC | Age: 59
End: 2019-09-18

## 2019-09-18 RX ORDER — AMOXICILLIN AND CLAVULANATE POTASSIUM 875; 125 MG/1; MG/1
1 TABLET, FILM COATED ORAL 2 TIMES DAILY
Qty: 14 TABLET | Refills: 0 | Status: SHIPPED | OUTPATIENT
Start: 2019-09-18 | End: 2019-09-18 | Stop reason: SDUPTHER

## 2019-09-18 RX ORDER — AMOXICILLIN AND CLAVULANATE POTASSIUM 875; 125 MG/1; MG/1
1 TABLET, FILM COATED ORAL 2 TIMES DAILY
Qty: 14 TABLET | Refills: 0 | Status: SHIPPED | OUTPATIENT
Start: 2019-09-18 | End: 2019-09-25

## 2019-09-18 NOTE — TELEPHONE ENCOUNTER
Called pt and notified him of scan results and order for antibiotic and he v/u. Verified allergies and has NKDA and verified pharmacy and he wants [facility] in [place]. mh

## 2019-09-19 ENCOUNTER — APPOINTMENT (OUTPATIENT)
Dept: LAB | Facility: HOSPITAL | Age: 59
End: 2019-09-19

## 2019-09-19 ENCOUNTER — OFFICE VISIT (OUTPATIENT)
Dept: ONCOLOGY | Facility: CLINIC | Age: 59
End: 2019-09-19

## 2019-09-19 VITALS
HEART RATE: 63 BPM | HEIGHT: 72 IN | WEIGHT: 223.4 LBS | RESPIRATION RATE: 20 BRPM | SYSTOLIC BLOOD PRESSURE: 130 MMHG | TEMPERATURE: 97.8 F | DIASTOLIC BLOOD PRESSURE: 80 MMHG | BODY MASS INDEX: 30.26 KG/M2

## 2019-09-19 DIAGNOSIS — C15.9 ADENOCARCINOMA OF ESOPHAGUS (HCC): Primary | ICD-10-CM

## 2019-09-19 DIAGNOSIS — K21.9 GASTROESOPHAGEAL REFLUX DISEASE, ESOPHAGITIS PRESENCE NOT SPECIFIED: ICD-10-CM

## 2019-09-19 DIAGNOSIS — R93.89 ABNORMAL CT OF THE CHEST: ICD-10-CM

## 2019-09-19 LAB
ALBUMIN SERPL-MCNC: 3.9 G/DL (ref 3.5–4.8)
ALBUMIN/GLOB SERPL: 1.4 G/DL (ref 1–1.7)
ALP SERPL-CCNC: 72 U/L (ref 32–91)
ALT SERPL W P-5'-P-CCNC: 15 U/L (ref 17–63)
ANION GAP SERPL CALCULATED.3IONS-SCNC: 17.2 MMOL/L (ref 5–15)
AST SERPL-CCNC: 22 U/L (ref 15–41)
BASOPHILS # BLD AUTO: 0.04 10*3/MM3 (ref 0–0.2)
BASOPHILS NFR BLD AUTO: 0.9 % (ref 0–1.5)
BILIRUB SERPL-MCNC: 0.7 MG/DL (ref 0.3–1.2)
BUN BLD-MCNC: 19 MG/DL (ref 8–20)
BUN/CREAT SERPL: 19 (ref 6.2–20.3)
CALCIUM SPEC-SCNC: 9.1 MG/DL (ref 8.9–10.3)
CHLORIDE SERPL-SCNC: 102 MMOL/L (ref 101–111)
CO2 SERPL-SCNC: 23 MMOL/L (ref 22–32)
CREAT BLD-MCNC: 1 MG/DL (ref 0.7–1.2)
DEPRECATED RDW RBC AUTO: 44.5 FL (ref 37–54)
EOSINOPHIL # BLD AUTO: 0.2 10*3/MM3 (ref 0–0.4)
EOSINOPHIL NFR BLD AUTO: 4.7 % (ref 0.3–6.2)
ERYTHROCYTE [DISTWIDTH] IN BLOOD BY AUTOMATED COUNT: 14.3 % (ref 12.3–15.4)
GFR SERPL CREATININE-BSD FRML MDRD: 77 ML/MIN/1.73
GLOBULIN UR ELPH-MCNC: 2.7 GM/DL (ref 2.5–3.8)
GLUCOSE BLD-MCNC: 57 MG/DL (ref 65–99)
HCT VFR BLD AUTO: 46.4 % (ref 37.5–51)
HGB BLD-MCNC: 15.7 G/DL (ref 13–17.7)
LYMPHOCYTES # BLD AUTO: 0.94 10*3/MM3 (ref 0.7–3.1)
LYMPHOCYTES NFR BLD AUTO: 22.1 % (ref 19.6–45.3)
MCH RBC QN AUTO: 29.1 PG (ref 26.6–33)
MCHC RBC AUTO-ENTMCNC: 33.8 G/DL (ref 31.5–35.7)
MCV RBC AUTO: 85.9 FL (ref 79–97)
MONOCYTES # BLD AUTO: 0.71 10*3/MM3 (ref 0.1–0.9)
MONOCYTES NFR BLD AUTO: 16.7 % (ref 5–12)
NEUTROPHILS # BLD AUTO: 2.37 10*3/MM3 (ref 1.7–7)
NEUTROPHILS NFR BLD AUTO: 55.6 % (ref 42.7–76)
PLATELET # BLD AUTO: 199 10*3/MM3 (ref 140–450)
PMV BLD AUTO: 9.4 FL (ref 6–12)
POTASSIUM BLD-SCNC: 4.2 MMOL/L (ref 3.6–5.1)
PROT SERPL-MCNC: 6.6 G/DL (ref 6.1–7.9)
RBC # BLD AUTO: 5.4 10*6/MM3 (ref 4.14–5.8)
SODIUM BLD-SCNC: 138 MMOL/L (ref 136–144)
WBC NRBC COR # BLD: 4.26 10*3/MM3 (ref 3.4–10.8)

## 2019-09-19 PROCEDURE — 36415 COLL VENOUS BLD VENIPUNCTURE: CPT | Performed by: NURSE PRACTITIONER

## 2019-09-19 PROCEDURE — 80053 COMPREHEN METABOLIC PANEL: CPT | Performed by: NURSE PRACTITIONER

## 2019-09-19 PROCEDURE — 99214 OFFICE O/P EST MOD 30 MIN: CPT | Performed by: NURSE PRACTITIONER

## 2019-09-19 PROCEDURE — 85025 COMPLETE CBC W/AUTO DIFF WBC: CPT | Performed by: NURSE PRACTITIONER

## 2019-09-19 RX ORDER — OMEPRAZOLE 40 MG/1
CAPSULE, DELAYED RELEASE ORAL
Refills: 1 | COMMUNITY
Start: 2019-06-12 | End: 2022-07-29 | Stop reason: SDUPTHER

## 2019-09-19 RX ORDER — LANOLIN ALCOHOL/MO/W.PET/CERES
CREAM (GRAM) TOPICAL EVERY 24 HOURS
COMMUNITY
Start: 2018-09-04

## 2019-09-19 RX ORDER — ATORVASTATIN CALCIUM 20 MG/1
TABLET, FILM COATED ORAL
Refills: 0 | COMMUNITY
Start: 2019-06-12

## 2019-09-19 RX ORDER — METOCLOPRAMIDE 5 MG/1
5 TABLET ORAL
Qty: 30 TABLET | Refills: 1 | Status: SHIPPED | OUTPATIENT
Start: 2019-09-19 | End: 2022-06-28

## 2019-09-23 ENCOUNTER — HOSPITAL ENCOUNTER (OUTPATIENT)
Dept: GENERAL RADIOLOGY | Facility: HOSPITAL | Age: 59
Discharge: HOME | End: 2019-09-23
Admitting: NURSE PRACTITIONER

## 2019-09-23 DIAGNOSIS — C15.9 ADENOCARCINOMA OF ESOPHAGUS (HCC): ICD-10-CM

## 2019-09-23 PROCEDURE — 74230 X-RAY XM SWLNG FUNCJ C+: CPT

## 2019-09-23 PROCEDURE — 92611 MOTION FLUOROSCOPY/SWALLOW: CPT

## 2019-09-23 NOTE — THERAPY EVALUATION
Outpatient Speech Language Pathology   Adult Swallow Initial Evaluation   [facility]     Patient Name: Americo Reyes  : 1960  MRN: [id number]  Today's Date: 2019         Visit Date: 2019   Patient Active Problem List   Diagnosis   • Adenocarcinoma distal esophagus stage IIIA   • Gastroesophageal reflux disease   • Abnormal CT of the chest with questionable aspiration        Past Medical History:   Diagnosis Date   • Arthritis    • Elevated triglycerides with high cholesterol    • GERD (gastroesophageal reflux disease)         Past Surgical History:   Procedure Laterality Date   • CYST REMOVAL Right 2007    benign on right thigh   • VARICOSE VEIN SURGERY Left 2006         Visit Dx:     ICD-10-CM ICD-9-CM   1. Adenocarcinoma distal esophagus stage IIIA C15.9 150.9           SLP Adult Swallow Evaluation - 19 1000        Rehab Evaluation    Document Type  evaluation  (Pended)    -HC    Comment  Pt was seen today for a VFSS. Pt reports waking up in the middle of the night gagging and choking. Pt does not know if this is related to his swallowing funciton or possibly his history of esophageal problems.   (Pended)    -HC       General Information    Patient Profile Reviewed  yes  (Pended)    -HC    Pertinent History Of Current Problem  Pt reports waking up in the middle of the night gagging and choking. Pt reports history of esophageal cancer that was found in  and was removed via surgery in . Pt takes acid reflux medicaiton daily (Omeprazole) with occasional Tums. Pt reports he bought a bed that the head is able to be raised to help with his acid reflux problems. He reports these episodes are periodic. He completed the EAT-10 and obtains a score of 3 (indicative of minimal impact on function/daily life)   (Pended)    -HC    Current Method of Nutrition  regular textures;thin liquids  (Pended)    -HC       Oral Motor and Function    Dentition Assessment  natural, present and  adequate  (Pended)    -       Oral Musculature and Cranial Nerve Assessment    Oral Motor General Assessment  WFL  (Pended). An informal assessment of the oral musculature revealed structures and function to be WFL. No overt weakness or asymmetry noted.   -       MBS/VFSS    Utensils Used  cup;spoon;straw  (Pended)    -    Consistencies Trialed  regular textures;mechanical soft, no mixed consistencies;pureed;thin liquids;barium pill  (Pended)  mechanical soft with mixed consistencies   -       MBS/VFSS Interpretation    Oral Prep Phase  WFL  (Pended)    -HC    Oral Transit Phase  WFL  (Pended)    -    Oral Residue  WFL  (Pended)    -    VFSS Summary  VFSS completed in standing position, lateral projection. Pt self fed. Study was initated with thin liquids by straw x2, followed by puree x2 (applesauce mixed with barium), mechanical soft/mixed consistencies x2 (peaches), regular solid x1 (cracker), thin liquid wash by straw x1 repecitively, & barium pill with water x1. Oropharyngeal phase WFL for all consistencies trialed. Oral transit and bolus control were WFL. He is able to form a cohesive bolus and propel posteriorly with no functional difficulties. Labial seal is adequate. No anterior spillage evident. He clears the oral cavity well. Premature spillage noted on trial 2 of mechanical soft/mixed consistencies. Adeuqate laryngeal elevation, epigllotic deflection & UES opening results in hypopharyngeal clearance without laryngeal vestibular penetration or aspiration. He obtains a 1 on the Penetration-Aspiration Scale indicates material does not enter the airway. No remarkable esophageal findings with only mild distal retention with fruit, however does clear. The barium tablet passed readily into the stomach when taken with thin liquid.   (Pended)    -    Oral Phase, Comment  Functional mastication on all consistencies trialed. Mixed consistencies noted to have premature spillage on the 2nd trial but  with adequate airway protection.    (Pended)    -HC       Initiation of Pharyngeal Swallow    Initiation of Pharyngeal Swallow  WFL  (Pended)    -HC    Pharyngeal Phase  functional pharyngeal phase of swallowing  (Pended)    -HC    Pharyngeal Phase, Comment  No penetration or aspiration at any time.   (Pended)    -HC       Esophageal Phase    Esophageal Phase  other (see comments)  (Pended)  Suspected esophageal phase dysfunction   -HC    Esophageal Phase, Comment  Due to pt's history of esophageal cancer & acid reflux, pt's reported problems are suspected to be esophageal in nature. He completed the Reflux Symptom Index and scored a 19 on this (with results greater than 13 possibly indicative of significant reflux disease). (Pended)    -       Recommendations    SLP Diet Recommendation  regular textures;thin liquids  (Pended)    -HC    Recommended Precautions and Strategies  upright posture during/after eating, continue to follow reflux precautions and medications as prescribed by his physicians (Pended)    -HC    Demonstrates Need for Referral to Another Service The patient is referred back to his PCP for further work up of his complaints. He may benefit from gastroenterology referral or follow up as it is suspected his complaints/symptoms may be more esophageal in nature.  (Pended)    -      User Key  (r) = Recorded By, (t) = Taken By, (c) = Cosigned By    Initials Name Provider Type     Ninoska Dong, Speech Therapy Student Speech and Language Pathologist            It is recommended that the patient remain on his current diet of regular foods and thin liquids. Should further esophageal workup be required, it is recommended that pt receive GI consultation. Further recommendations at the discretion of patient's PCP.      Thank you for referring this pleasant patient & please feel free to contact our office at [phone] with any questions regarding today's procedure.                 Ninoska Dong,  Speech Therapy Student  9/23/2019

## 2019-10-01 ENCOUNTER — OFFICE VISIT (OUTPATIENT)
Dept: SURGERY | Facility: CLINIC | Age: 59
End: 2019-10-01

## 2019-10-01 VITALS
HEART RATE: 63 BPM | SYSTOLIC BLOOD PRESSURE: 132 MMHG | TEMPERATURE: 98.2 F | WEIGHT: 223 LBS | OXYGEN SATURATION: 97 % | DIASTOLIC BLOOD PRESSURE: 84 MMHG | BODY MASS INDEX: 30.24 KG/M2

## 2019-10-01 DIAGNOSIS — Z92.21 HISTORY OF CHEMOTHERAPY: ICD-10-CM

## 2019-10-01 DIAGNOSIS — C15.9 ADENOCARCINOMA OF ESOPHAGUS (HCC): Primary | Chronic | ICD-10-CM

## 2019-10-01 DIAGNOSIS — R93.89 ABNORMAL CT OF THE CHEST: ICD-10-CM

## 2019-10-01 DIAGNOSIS — Z85.01 HISTORY OF ESOPHAGEAL CANCER: ICD-10-CM

## 2019-10-01 DIAGNOSIS — R91.8 PULMONARY NODULES/LESIONS, MULTIPLE: ICD-10-CM

## 2019-10-01 PROCEDURE — 99214 OFFICE O/P EST MOD 30 MIN: CPT | Performed by: THORACIC SURGERY (CARDIOTHORACIC VASCULAR SURGERY)

## 2019-10-01 NOTE — PROGRESS NOTES
Thoracic surgery progress note  Chief complaint follow-up of esophageal cancer resection   patient is seen in follow-up of his esophageal cancer.  CT scan was obtained.  He has patchy nodular disease bilaterally.  It is most likely consistent with aspiration.  On questioning the patient does have intermittent aspiration particularly at night.  Reglan has been initiated.  Patient does not have respiratory distress.  No fevers or chills.  Occasional cough.  No significant dysphagia or weight loss.  All systems have been reviewed.  The patient does not report any significant symptoms all systems are negative.  The patient is staying smoke-free.  On physical examination he appears well and is in no distress.  Symmetrical chest expansion.  Mentation normal speech gait and station normal    Impression #1 history of esophageal cancer no evidence of recurrence #2 bilateral pulmonary nodules we will plan on a repeat CT scan in 4 months #3 gastroesophageal reflux omeprazole and metoclopramide initiated.  I personally examined CT scan and reviewed the radiology report.  I concur with it.

## 2020-02-13 ENCOUNTER — OFFICE VISIT (OUTPATIENT)
Dept: SURGERY | Facility: CLINIC | Age: 60
End: 2020-02-13

## 2020-02-13 VITALS
DIASTOLIC BLOOD PRESSURE: 86 MMHG | SYSTOLIC BLOOD PRESSURE: 135 MMHG | OXYGEN SATURATION: 97 % | WEIGHT: 230 LBS | HEART RATE: 67 BPM | TEMPERATURE: 98.1 F | BODY MASS INDEX: 31.19 KG/M2

## 2020-02-13 DIAGNOSIS — R91.8 PULMONARY NODULES/LESIONS, MULTIPLE: ICD-10-CM

## 2020-02-13 DIAGNOSIS — C15.9 ADENOCARCINOMA OF ESOPHAGUS (HCC): Primary | Chronic | ICD-10-CM

## 2020-02-13 DIAGNOSIS — K21.9 GASTROESOPHAGEAL REFLUX DISEASE, ESOPHAGITIS PRESENCE NOT SPECIFIED: ICD-10-CM

## 2020-02-13 PROCEDURE — 99214 OFFICE O/P EST MOD 30 MIN: CPT | Performed by: THORACIC SURGERY (CARDIOTHORACIC VASCULAR SURGERY)

## 2020-02-13 NOTE — PROGRESS NOTES
Thoracic surgery progress note    Chief complaint follow-up of esophagectomy 4-1/2 years ago    The patient is in the [employer].  He is engaging in high physical activity exercises.  His weight is stable.  His appetite is good.  He has no weight loss.  His he has no significant dysphasia.  No fevers chills or night sweats.    He returns with a follow-up CT scan that I personally examined.  It shows postoperative changes of Iver Guanaco esophagogastrectomy.  There is an alveolar infiltrate in the left upper lobe.  The patient does describe aspiration episodes.  His previously noted reticular nodular process is resolved.  There is no evidence of recurrent cancer.  I personally examined the scan and reviewed the radiology reports.    On review of systems no head neck chest or abdominal pain    Physical examination no supraclavicular cervical or axillary adenopathy symmetrical chest expansion    No recent laboratory for review.    Impression #1 history of Iver Guanaco esophagogastrectomy without evidence of recurrent cancer  2.  Nocturnal aspiration.  I emphasized with the patient not eating before going to bed and elevating the head of bed  3.  New groundglass opacity right upper lobe plan follow-up CT scan  4.  Resolved reticular nodular infiltrates also likely related to aspiration.  Patient does not have any infectious symptoms.  No fevers chills or night sweats    I suspect with the patient's high level of physical activity and is sleeping habits he is silently aspirating or having occasional symptomatic aspiration.  I do not think these findings reflect recurrent cancer.  Plan follow-up CT scan in 6 months for 5-year follow-up

## 2020-08-13 ENCOUNTER — OFFICE VISIT (OUTPATIENT)
Dept: SURGERY | Facility: CLINIC | Age: 60
End: 2020-08-13

## 2020-08-13 VITALS
SYSTOLIC BLOOD PRESSURE: 118 MMHG | WEIGHT: 215 LBS | TEMPERATURE: 97.3 F | DIASTOLIC BLOOD PRESSURE: 77 MMHG | OXYGEN SATURATION: 97 % | HEART RATE: 66 BPM | HEIGHT: 72 IN | BODY MASS INDEX: 29.12 KG/M2

## 2020-08-13 DIAGNOSIS — Z85.01 HISTORY OF ESOPHAGEAL CANCER: ICD-10-CM

## 2020-08-13 DIAGNOSIS — C15.9 ADENOCARCINOMA OF ESOPHAGUS (HCC): Primary | Chronic | ICD-10-CM

## 2020-08-13 DIAGNOSIS — R93.89 ABNORMAL CT OF THE CHEST: ICD-10-CM

## 2020-08-13 PROCEDURE — 99214 OFFICE O/P EST MOD 30 MIN: CPT | Performed by: THORACIC SURGERY (CARDIOTHORACIC VASCULAR SURGERY)

## 2020-08-13 NOTE — PROGRESS NOTES
Thoracic surgery progress note    Chief complaint follow-up of esophagectomy 5 and half years ago and history of aspiration    Patient returns with a follow-up CT scan performed at [facility] dated August 7, 2020.  The chest CT scan shows areas of groundglass opacity.  The stomach has solid contents within it as well as air.  The patient does report that his bed broke and he is no longer able to sleep with the head of the bed elevated.  I instructed him with the importance of avoiding aspiration avoiding eating before he goes to bed and sleeping with the head of the bed elevated.  The patient has not had any respiratory symptoms however no fevers chills or night sweats no hemoptysis.  No purulent sputum production.  In general he feels very well.  He is gradually gaining weight weighing 215 pounds.    I personally examined the CT scan and reviewed the radiology report and I concur with it.    On review of systems he reports no positives he feels completely asymptomatic.  Specifically all symptoms reported negative.  All systems.    Physical examination well-appearing no distress wearing COVID-19 mask.  Sclera anicteric conjunctiva pink neck supple no supraclavicular cervical lymphadenopathy.  Chest expansion is symmetrical.  Abdomen nondistended nontender.    Impression #1 status post Iver Guanaco esophagogastrectomy 5 and half years ago for esophageal cancer no evidence of recurrence  2.  Scattered pulmonary infiltrates most consistent with aspiration.  Plan repeat CT scan in 3 months as per radiology recommendations  3.  Aspiration precautions given to the patient.  Also recommend follow-up with primary care for reinforcement.  4.  COVID-19 precautions.  Instructions given.  Social distancing encouraged.  Use of mask encouraged for the by the patient and by those were in contact with him.

## 2020-11-12 ENCOUNTER — OFFICE VISIT (OUTPATIENT)
Dept: SURGERY | Facility: CLINIC | Age: 60
End: 2020-11-12

## 2020-11-12 VITALS
BODY MASS INDEX: 29.39 KG/M2 | TEMPERATURE: 97.8 F | OXYGEN SATURATION: 99 % | HEART RATE: 76 BPM | WEIGHT: 217 LBS | SYSTOLIC BLOOD PRESSURE: 139 MMHG | HEIGHT: 72 IN | DIASTOLIC BLOOD PRESSURE: 82 MMHG

## 2020-11-12 DIAGNOSIS — R93.89 ABNORMAL CT OF THE CHEST: ICD-10-CM

## 2020-11-12 DIAGNOSIS — C15.9 ADENOCARCINOMA OF ESOPHAGUS (HCC): Primary | Chronic | ICD-10-CM

## 2020-11-12 PROBLEM — E78.5 HYPERLIPIDEMIA: Status: ACTIVE | Noted: 2020-11-12

## 2020-11-12 PROCEDURE — 99212 OFFICE O/P EST SF 10 MIN: CPT | Performed by: THORACIC SURGERY (CARDIOTHORACIC VASCULAR SURGERY)

## 2020-11-12 NOTE — PROGRESS NOTES
Thoracic surgery progress note    Chief complaint follow-up of multiple pulmonary nodules    The patient underwent a repeat CT scan that shows clearing of his multiple pulmonary nodules.  The patient has a history of esophageal cancer resected over 5 years ago with no evidence of recurrence.  The patient has no significant dysphagia.  His weight is stable.  He has no evidence of recurrent disease.  I am discharging the patient from further follow-up.  He may follow-up with his primary care physician of course for happy to see the patient should any thoracic surgical issues arise.    10 minutes were spent in direct face-to-face time with the patient discussing these matters and reviewing his CT scan.

## 2020-12-11 ENCOUNTER — HOSPITAL ENCOUNTER (OUTPATIENT)
Dept: URGENT CARE | Facility: CLINIC | Age: 60
Discharge: HOME | End: 2020-12-11
Attending: NURSE PRACTITIONER

## 2020-12-15 LAB — SARS-COV-2 RNA SPEC QL NAA+PROBE: NOT DETECTED

## 2021-10-29 ENCOUNTER — OFFICE VISIT (OUTPATIENT)
Dept: PAIN MEDICINE | Facility: CLINIC | Age: 61
End: 2021-10-29

## 2021-10-29 VITALS
WEIGHT: 217 LBS | DIASTOLIC BLOOD PRESSURE: 93 MMHG | HEART RATE: 64 BPM | RESPIRATION RATE: 16 BRPM | SYSTOLIC BLOOD PRESSURE: 142 MMHG | OXYGEN SATURATION: 99 % | BODY MASS INDEX: 29.39 KG/M2 | HEIGHT: 72 IN | TEMPERATURE: 96.8 F

## 2021-10-29 DIAGNOSIS — R07.89 RIGHT-SIDED CHEST WALL PAIN: Primary | ICD-10-CM

## 2021-10-29 PROCEDURE — G0463 HOSPITAL OUTPT CLINIC VISIT: HCPCS | Performed by: STUDENT IN AN ORGANIZED HEALTH CARE EDUCATION/TRAINING PROGRAM

## 2021-10-29 PROCEDURE — 99204 OFFICE O/P NEW MOD 45 MIN: CPT | Performed by: STUDENT IN AN ORGANIZED HEALTH CARE EDUCATION/TRAINING PROGRAM

## 2021-10-29 RX ORDER — GABAPENTIN 100 MG/1
100 CAPSULE ORAL 3 TIMES DAILY
Qty: 90 CAPSULE | Refills: 0 | Status: SHIPPED | OUTPATIENT
Start: 2021-10-29 | End: 2022-06-28

## 2021-10-29 NOTE — PROGRESS NOTES
CHIEF COMPLAINT  Chief Complaint   Patient presents with   • Pain     right nipple,        Primary Care  Rebeka Clark MD    Subjective   Americo Reyes is a 61 y.o. male  who presents for right chest wall pain and right nipple pain.  He is an extremely poor historian and is unable to give definitive answers regarding his pain.  He states that he has pain directly around the nipple area.  He cannot describe any exacerbating or relieving factors and states that occasionally the pain will wake him up at night.  He states that usually walking around tends to improve the pain.  He has a significant history of esophagectomy for esophageal cancer several years ago however the pain only began approximately 1 year ago.  He states that he will occasionally take pain medication from his primary care provider but is unsure that provide some significant benefit.    History of Present Illness     Location: Right nipple  Onset: 1 year ago  Duration: Constant  Timing: Intermittent  Quality: Sharp, stabbing  Severity: Today: 7       Last Week: 7       Worst: 8  Modifying Factors: He cannot describe any exacerbating or relieving factors    Physical Therapy: no    Interval Update 10/29/2021:     The following portions of the patient's history were reviewed and updated as appropriate: allergies, current medications, past family history, past medical history, past social history, past surgical history and problem list.      Current Outpatient Medications:   •  aspirin 81 MG tablet, ASPIRIN 81 MG ORAL TABLET, Disp: , Rfl:   •  atorvastatin (LIPITOR) 20 MG tablet, TK 1 T PO QHS, Disp: , Rfl: 0  •  niacin (NIACIN SR,NIASPAN ER) 500 MG CR capsule, Daily., Disp: , Rfl:   •  omeprazole (priLOSEC) 40 MG capsule, TK 1 C PO ONCE DAILY, Disp: , Rfl: 1  •  gabapentin (NEURONTIN) 100 MG capsule, Take 1 capsule by mouth 3 (Three) Times a Day., Disp: 90 capsule, Rfl: 0  •  metoclopramide (REGLAN) 5 MG tablet, Take 1 tablet by mouth every  "night at bedtime., Disp: 30 tablet, Rfl: 1    Review of Systems   Cardiovascular: Positive for chest pain.   Musculoskeletal: Negative for arthralgias, back pain and neck pain.   Neurological: Negative for weakness and numbness.       Vitals:    10/29/21 0854   BP: 142/93   Pulse: 64   Resp: 16   Temp: 96.8 °F (36 °C)   SpO2: 99%   Weight: 98.4 kg (217 lb)   Height: 182.9 cm (72.01\")   PainSc:   7       Objective   Physical Exam  Vitals and nursing note reviewed.   Constitutional:       General: He is not in acute distress.     Appearance: Normal appearance. He is normal weight.   Neurological:      Mental Status: He is alert.   Psychiatric:         Mood and Affect: Mood is anxious.         Cognition and Memory: Cognition is impaired. Memory is impaired.         Judgment: Judgment is impulsive.           Assessment/Plan   Problems Addressed this Visit     None      Visit Diagnoses     Right-sided chest wall pain    -  Primary      Diagnoses       Codes Comments    Right-sided chest wall pain    -  Primary ICD-10-CM: R07.89  ICD-9-CM: 786.52           Plan:  1. Given his very vague history as well as his unusual symptoms, it is difficult to determine the exact cause of his pain at the moment.  It may be a component of some type of neuralgia although he does not have any radicular type symptoms and his pain is isolated to the area below the nipple.  2. We will start him on gabapentin 100 mg 3 times daily and see him back in 1 month  --- Follow-up 1 month           INSPECT REPORT    As part of the patient's treatment plan, I may be prescribing controlled substances. The patient has been made aware of appropriate use of such medications, including potential risk of somnolence, limited ability to drive and/or work safely, and the potential for dependence or overdose. It has also bee made clear that these medications are for use by this patient only, without concomitant use of alcohol or other substances unless " prescribed.     Patient has completed prescribing agreement detailing terms of continued prescribing of controlled substances, including monitoring ELISE reports, urine drug screening, and pill counts if necessary. The patient is aware that inappropriate use will results in cessation of prescribing such medications.    INSPECT report has been reviewed and scanned into the patient's chart.    As the clinician, I personally reviewed the INSPECT from 10/28/2021.    History and physical exam exhibit continued safe and appropriate use of controlled substances.      EMR Dragon/Transcription disclaimer:   Much of this encounter note is an electronic transcription/translation of spoken language to printed text. The electronic translation of spoken language may permit erroneous, or at times, nonsensical words or phrases to be inadvertently transcribed; Although I have reviewed the note for such errors, some may still exist.

## 2022-06-28 ENCOUNTER — OFFICE VISIT (OUTPATIENT)
Dept: FAMILY MEDICINE CLINIC | Facility: CLINIC | Age: 62
End: 2022-06-28

## 2022-06-28 VITALS
WEIGHT: 195.8 LBS | RESPIRATION RATE: 20 BRPM | HEIGHT: 72 IN | OXYGEN SATURATION: 99 % | BODY MASS INDEX: 26.52 KG/M2 | DIASTOLIC BLOOD PRESSURE: 80 MMHG | TEMPERATURE: 97.8 F | HEART RATE: 90 BPM | SYSTOLIC BLOOD PRESSURE: 118 MMHG

## 2022-06-28 DIAGNOSIS — E55.9 VITAMIN D DEFICIENCY: ICD-10-CM

## 2022-06-28 DIAGNOSIS — Z85.01 HISTORY OF ESOPHAGEAL CANCER: ICD-10-CM

## 2022-06-28 DIAGNOSIS — K21.9 GASTROESOPHAGEAL REFLUX DISEASE, UNSPECIFIED WHETHER ESOPHAGITIS PRESENT: ICD-10-CM

## 2022-06-28 DIAGNOSIS — Z76.89 ENCOUNTER TO ESTABLISH CARE WITH NEW DOCTOR: Primary | ICD-10-CM

## 2022-06-28 DIAGNOSIS — Z92.21 HISTORY OF CHEMOTHERAPY: ICD-10-CM

## 2022-06-28 DIAGNOSIS — C15.9 ADENOCARCINOMA OF ESOPHAGUS: ICD-10-CM

## 2022-06-28 DIAGNOSIS — M79.2 NEUROPATHIC PAIN OF CHEST: ICD-10-CM

## 2022-06-28 DIAGNOSIS — E66.3 OVERWEIGHT (BMI 25.0-29.9): ICD-10-CM

## 2022-06-28 DIAGNOSIS — R63.4 ABNORMAL WEIGHT LOSS: ICD-10-CM

## 2022-06-28 DIAGNOSIS — E78.5 HYPERLIPIDEMIA, UNSPECIFIED HYPERLIPIDEMIA TYPE: ICD-10-CM

## 2022-06-28 PROCEDURE — 99204 OFFICE O/P NEW MOD 45 MIN: CPT | Performed by: FAMILY MEDICINE

## 2022-06-28 RX ORDER — GABAPENTIN 300 MG/1
300 CAPSULE ORAL NIGHTLY
Qty: 60 CAPSULE | Refills: 1 | Status: SHIPPED | OUTPATIENT
Start: 2022-06-28 | End: 2022-07-29 | Stop reason: SDUPTHER

## 2022-06-28 RX ORDER — CALCIUM CARBONATE 200(500)MG
1 TABLET,CHEWABLE ORAL DAILY
COMMUNITY

## 2022-06-29 ENCOUNTER — TELEPHONE (OUTPATIENT)
Dept: FAMILY MEDICINE CLINIC | Facility: CLINIC | Age: 62
End: 2022-06-29

## 2022-06-30 ENCOUNTER — PATIENT ROUNDING (BHMG ONLY) (OUTPATIENT)
Dept: FAMILY MEDICINE CLINIC | Facility: CLINIC | Age: 62
End: 2022-06-30

## 2022-06-30 NOTE — PROGRESS NOTES
June 30, 2022    Voicemail left. Hello, My name is Salina Javed, and I am calling your from    [facility], [facility]  I wanted to welcome you to our practice and ensure that your first visit went smoothly. If you have any question or concerns, feel free to reach out to me directly at [phone]. Thank you and have a great day!

## 2022-07-01 ENCOUNTER — TELEPHONE (OUTPATIENT)
Dept: FAMILY MEDICINE CLINIC | Facility: CLINIC | Age: 62
End: 2022-07-01

## 2022-07-01 NOTE — TELEPHONE ENCOUNTER
HUB TO READ:    LMOM regarding referral to oncology office. Dr Flaherty is no longer with [facility]. Would you prefer to see Dr Flaherty at his new location or [facility].

## 2022-07-01 NOTE — TELEPHONE ENCOUNTER
Yes, patient has history of esophageal cancer, previously treated by Dr. Flaherty.  Did not believe he had have been told not to continue annual follow-up, just did not schedule any appointments during COVID and was requesting referral for continued surveillance.  Note has not been completed, I will try to have it signed before Tuesday and include any additional information.

## 2022-07-14 ENCOUNTER — TELEPHONE (OUTPATIENT)
Dept: FAMILY MEDICINE CLINIC | Facility: CLINIC | Age: 62
End: 2022-07-14

## 2022-07-14 PROBLEM — Z76.89 ENCOUNTER TO ESTABLISH CARE WITH NEW DOCTOR: Status: ACTIVE | Noted: 2022-07-14

## 2022-07-14 NOTE — TELEPHONE ENCOUNTER
Yes, I placed a referral to Dr. Flaherty when I saw patient initially on June 28.  There has been communication in the referral indicating that we were waiting to hear back from him to confirm he wanted to see Dr. Flaherty.  Please get this scheduled with Dr. Flaherty as soon as possible to avoid any additional delay

## 2022-07-14 NOTE — TELEPHONE ENCOUNTER
Also, I have just reviewed patient's chart.  He did not get labs as I had ordered on that date, please remind to get labs sometime before scheduled follow-up on July 29.  Thank you

## 2022-07-14 NOTE — TELEPHONE ENCOUNTER
----- Message from Americo Reyes sent at 7/14/2022 12:03 PM EDT -----  Regarding: Referral to Dr Flaherty   I met with a [facility] representative today at [facility] and she was needing a diagnosis of my physical health conditions so she could file a VA claim on my behalf. The VA representative felt I was confused and distracted and was concerned for my well being and asks if it would be okay to follow up with my wife Arina, I have no problem with that. My wife was contacted and she thought there was a referral pending for Dr Flaherty? She also asked the VA representative if she needed me to get a psychiatric evaluations and the representative stated that the VA would do that for me. I’m guessing that Dr Flaherty is not in the [facility] anymore but I would like to still see him for my  cancer follow ups. Awaiting answer to know what steps I need to take in order to get a charbel diagnosis.          Thanks Arslan Reyes

## 2022-07-15 NOTE — TELEPHONE ENCOUNTER
LVM for pt to return call with information on which location to see Dr. Foss. Also left message about labs to be done before 7/29/22.

## 2022-07-29 ENCOUNTER — OFFICE VISIT (OUTPATIENT)
Dept: FAMILY MEDICINE CLINIC | Facility: CLINIC | Age: 62
End: 2022-07-29

## 2022-07-29 VITALS
HEIGHT: 72 IN | BODY MASS INDEX: 26.06 KG/M2 | DIASTOLIC BLOOD PRESSURE: 78 MMHG | WEIGHT: 192.4 LBS | HEART RATE: 84 BPM | OXYGEN SATURATION: 99 % | RESPIRATION RATE: 18 BRPM | SYSTOLIC BLOOD PRESSURE: 114 MMHG | TEMPERATURE: 98 F

## 2022-07-29 DIAGNOSIS — R41.3 MEMORY DIFFICULTIES: ICD-10-CM

## 2022-07-29 DIAGNOSIS — K21.9 GASTROESOPHAGEAL REFLUX DISEASE, UNSPECIFIED WHETHER ESOPHAGITIS PRESENT: ICD-10-CM

## 2022-07-29 DIAGNOSIS — M79.2 NEUROPATHIC PAIN OF CHEST: Primary | ICD-10-CM

## 2022-07-29 DIAGNOSIS — E66.3 OVERWEIGHT (BMI 25.0-29.9): ICD-10-CM

## 2022-07-29 DIAGNOSIS — C15.9 ADENOCARCINOMA OF ESOPHAGUS: ICD-10-CM

## 2022-07-29 DIAGNOSIS — Z85.01 HISTORY OF ESOPHAGEAL CANCER: ICD-10-CM

## 2022-07-29 PROCEDURE — 99214 OFFICE O/P EST MOD 30 MIN: CPT | Performed by: FAMILY MEDICINE

## 2022-07-29 RX ORDER — OMEPRAZOLE 40 MG/1
40 CAPSULE, DELAYED RELEASE ORAL DAILY
Qty: 90 CAPSULE | Refills: 1 | Status: SHIPPED | OUTPATIENT
Start: 2022-07-29

## 2022-07-29 RX ORDER — GABAPENTIN 300 MG/1
300 CAPSULE ORAL NIGHTLY
Qty: 90 CAPSULE | Refills: 1 | Status: SHIPPED | OUTPATIENT
Start: 2022-07-29 | End: 2022-08-30

## 2022-08-02 LAB
25(OH)D3+25(OH)D2 SERPL-MCNC: 42.3 NG/ML (ref 30–100)
ALBUMIN SERPL-MCNC: 4.2 G/DL (ref 3.8–4.9)
ALBUMIN/GLOB SERPL: 1.9 {RATIO} (ref 1.2–2.2)
ALP SERPL-CCNC: 77 IU/L (ref 44–121)
ALT SERPL-CCNC: 15 IU/L (ref 0–44)
AST SERPL-CCNC: 22 IU/L (ref 0–40)
BASOPHILS # BLD AUTO: 0 X10E3/UL (ref 0–0.2)
BASOPHILS NFR BLD AUTO: 1 %
BILIRUB SERPL-MCNC: 0.4 MG/DL (ref 0–1.2)
BUN SERPL-MCNC: 24 MG/DL (ref 8–27)
BUN/CREAT SERPL: 29 (ref 10–24)
CALCIUM SERPL-MCNC: 9.4 MG/DL (ref 8.6–10.2)
CHLORIDE SERPL-SCNC: 101 MMOL/L (ref 96–106)
CHOLEST SERPL-MCNC: 199 MG/DL (ref 100–199)
CO2 SERPL-SCNC: 27 MMOL/L (ref 20–29)
CREAT SERPL-MCNC: 0.84 MG/DL (ref 0.76–1.27)
EGFRCR SERPLBLD CKD-EPI 2021: 100 ML/MIN/1.73
EOSINOPHIL # BLD AUTO: 0.1 X10E3/UL (ref 0–0.4)
EOSINOPHIL NFR BLD AUTO: 2 %
ERYTHROCYTE [DISTWIDTH] IN BLOOD BY AUTOMATED COUNT: 14.1 % (ref 11.6–15.4)
GLOBULIN SER CALC-MCNC: 2.2 G/DL (ref 1.5–4.5)
GLUCOSE SERPL-MCNC: 106 MG/DL (ref 65–99)
HCT VFR BLD AUTO: 49.8 % (ref 37.5–51)
HDLC SERPL-MCNC: 48 MG/DL
HGB BLD-MCNC: 16.3 G/DL (ref 13–17.7)
IMM GRANULOCYTES # BLD AUTO: 0 X10E3/UL (ref 0–0.1)
IMM GRANULOCYTES NFR BLD AUTO: 0 %
LDLC SERPL CALC-MCNC: 118 MG/DL (ref 0–99)
LYMPHOCYTES # BLD AUTO: 1.3 X10E3/UL (ref 0.7–3.1)
LYMPHOCYTES NFR BLD AUTO: 27 %
MCH RBC QN AUTO: 28.7 PG (ref 26.6–33)
MCHC RBC AUTO-ENTMCNC: 32.7 G/DL (ref 31.5–35.7)
MCV RBC AUTO: 88 FL (ref 79–97)
MONOCYTES # BLD AUTO: 0.7 X10E3/UL (ref 0.1–0.9)
MONOCYTES NFR BLD AUTO: 15 %
NEUTROPHILS # BLD AUTO: 2.5 X10E3/UL (ref 1.4–7)
NEUTROPHILS NFR BLD AUTO: 55 %
PLATELET # BLD AUTO: 224 X10E3/UL (ref 150–450)
POTASSIUM SERPL-SCNC: 5 MMOL/L (ref 3.5–5.2)
PROT SERPL-MCNC: 6.4 G/DL (ref 6–8.5)
RBC # BLD AUTO: 5.67 X10E6/UL (ref 4.14–5.8)
SODIUM SERPL-SCNC: 141 MMOL/L (ref 134–144)
T4 FREE SERPL-MCNC: 1.08 NG/DL (ref 0.82–1.77)
TRIGL SERPL-MCNC: 190 MG/DL (ref 0–149)
TSH SERPL DL<=0.005 MIU/L-ACNC: 4.32 UIU/ML (ref 0.45–4.5)
VIT B12 SERPL-MCNC: 550 PG/ML (ref 232–1245)
VLDLC SERPL CALC-MCNC: 33 MG/DL (ref 5–40)
WBC # BLD AUTO: 4.6 X10E3/UL (ref 3.4–10.8)

## 2022-08-16 ENCOUNTER — TELEPHONE (OUTPATIENT)
Dept: FAMILY MEDICINE CLINIC | Facility: CLINIC | Age: 62
End: 2022-08-16

## 2022-08-16 NOTE — TELEPHONE ENCOUNTER
----- Message from Yun Hawk MD sent at 8/14/2022  8:05 PM EDT -----  Tanya,  I have sent the following message to patient through [facility]:    Tanya,  Can you please contact patient, make sure he has seen his result message, schedule with myself for an annual physical and make sure he has followed through with scheduling with GI and psychiatry and pain management.  The following is my chart message:  Recent labs showed normal blood count, vitamin D and thyroid testing.  Cholesterol okay but LDL was a little above ideal if you have been taking Lipitor 20 mg daily as prescribed would recommend increasing if you have not been taking it I would recommend restarting.  I have not received notification about GI, do have an appointment scheduled?  Did you schedule with psychiatry?  I do not see that you scheduled an annual exam with myself.  Would like you to schedule that in the near future.     The 10-year ASCVD risk score (Ruthamanda DON Jr., et al., 2013) is: 7.3%    Values used to calculate the score:      Age: 62 years      Sex: Male      Is Non- : No      Diabetic: No      Tobacco smoker: No      Systolic Blood Pressure: 114 mmHg      Is BP treated: No      HDL Cholesterol: 48 mg/dL      Total Cholesterol: 199 mg/dL

## 2022-08-29 DIAGNOSIS — M79.2 NEUROPATHIC PAIN OF CHEST: ICD-10-CM

## 2022-08-30 RX ORDER — GABAPENTIN 300 MG/1
CAPSULE ORAL
Qty: 60 CAPSULE | Refills: 0 | Status: SHIPPED | OUTPATIENT
Start: 2022-08-30

## 2022-10-08 ENCOUNTER — FLU SHOT (OUTPATIENT)
Dept: FAMILY MEDICINE CLINIC | Facility: CLINIC | Age: 62
End: 2022-10-08

## 2022-10-08 DIAGNOSIS — Z23 NEED FOR INFLUENZA VACCINATION: Primary | ICD-10-CM

## 2022-10-08 PROCEDURE — 90686 IIV4 VACC NO PRSV 0.5 ML IM: CPT | Performed by: NURSE PRACTITIONER

## 2022-10-08 PROCEDURE — 90471 IMMUNIZATION ADMIN: CPT | Performed by: NURSE PRACTITIONER

## 2023-02-06 ENCOUNTER — TRANSCRIBE ORDERS (OUTPATIENT)
Dept: ADMINISTRATIVE | Facility: HOSPITAL | Age: 63
End: 2023-02-06
Payer: COMMERCIAL

## 2023-02-06 ENCOUNTER — LAB (OUTPATIENT)
Dept: LAB | Facility: HOSPITAL | Age: 63
End: 2023-02-06
Payer: COMMERCIAL

## 2023-02-06 DIAGNOSIS — C15.5 MALIGNANT NEOPLASM OF ABDOMINAL ESOPHAGUS: Primary | ICD-10-CM

## 2023-02-06 DIAGNOSIS — C15.5 MALIGNANT NEOPLASM OF ABDOMINAL ESOPHAGUS: ICD-10-CM

## 2023-02-06 PROCEDURE — 36415 COLL VENOUS BLD VENIPUNCTURE: CPT

## 2023-02-06 PROCEDURE — 80053 COMPREHEN METABOLIC PANEL: CPT

## 2023-02-06 PROCEDURE — 85025 COMPLETE CBC W/AUTO DIFF WBC: CPT

## 2023-02-07 LAB
ALBUMIN SERPL-MCNC: 4.2 G/DL (ref 3.5–5.2)
ALBUMIN/GLOB SERPL: 1.6 G/DL
ALP SERPL-CCNC: 85 U/L (ref 39–117)
ALT SERPL W P-5'-P-CCNC: 16 U/L (ref 1–41)
ANION GAP SERPL CALCULATED.3IONS-SCNC: 8.8 MMOL/L (ref 5–15)
AST SERPL-CCNC: 21 U/L (ref 1–40)
BASOPHILS # BLD AUTO: 0.04 10*3/MM3 (ref 0–0.2)
BASOPHILS NFR BLD AUTO: 0.7 % (ref 0–1.5)
BILIRUB SERPL-MCNC: 0.3 MG/DL (ref 0–1.2)
BUN SERPL-MCNC: 25 MG/DL (ref 8–23)
BUN/CREAT SERPL: 25.8 (ref 7–25)
CALCIUM SPEC-SCNC: 9.4 MG/DL (ref 8.6–10.5)
CHLORIDE SERPL-SCNC: 100 MMOL/L (ref 98–107)
CO2 SERPL-SCNC: 31.2 MMOL/L (ref 22–29)
CREAT SERPL-MCNC: 0.97 MG/DL (ref 0.76–1.27)
DEPRECATED RDW RBC AUTO: 44.8 FL (ref 37–54)
EGFRCR SERPLBLD CKD-EPI 2021: 88.8 ML/MIN/1.73
EOSINOPHIL # BLD AUTO: 0.02 10*3/MM3 (ref 0–0.4)
EOSINOPHIL NFR BLD AUTO: 0.4 % (ref 0.3–6.2)
ERYTHROCYTE [DISTWIDTH] IN BLOOD BY AUTOMATED COUNT: 13.8 % (ref 12.3–15.4)
GLOBULIN UR ELPH-MCNC: 2.7 GM/DL
GLUCOSE SERPL-MCNC: 83 MG/DL (ref 65–99)
HCT VFR BLD AUTO: 45.8 % (ref 37.5–51)
HGB BLD-MCNC: 15.1 G/DL (ref 13–17.7)
IMM GRANULOCYTES # BLD AUTO: 0.01 10*3/MM3 (ref 0–0.05)
IMM GRANULOCYTES NFR BLD AUTO: 0.2 % (ref 0–0.5)
LYMPHOCYTES # BLD AUTO: 1.43 10*3/MM3 (ref 0.7–3.1)
LYMPHOCYTES NFR BLD AUTO: 25.4 % (ref 19.6–45.3)
MCH RBC QN AUTO: 28.7 PG (ref 26.6–33)
MCHC RBC AUTO-ENTMCNC: 33 G/DL (ref 31.5–35.7)
MCV RBC AUTO: 87.1 FL (ref 79–97)
MONOCYTES # BLD AUTO: 0.78 10*3/MM3 (ref 0.1–0.9)
MONOCYTES NFR BLD AUTO: 13.9 % (ref 5–12)
NEUTROPHILS NFR BLD AUTO: 3.35 10*3/MM3 (ref 1.7–7)
NEUTROPHILS NFR BLD AUTO: 59.4 % (ref 42.7–76)
NRBC BLD AUTO-RTO: 0 /100 WBC (ref 0–0.2)
PLATELET # BLD AUTO: 206 10*3/MM3 (ref 140–450)
PMV BLD AUTO: 9.9 FL (ref 6–12)
POTASSIUM SERPL-SCNC: 4.6 MMOL/L (ref 3.5–5.2)
PROT SERPL-MCNC: 6.9 G/DL (ref 6–8.5)
RBC # BLD AUTO: 5.26 10*6/MM3 (ref 4.14–5.8)
SODIUM SERPL-SCNC: 140 MMOL/L (ref 136–145)
WBC NRBC COR # BLD: 5.63 10*3/MM3 (ref 3.4–10.8)

## 2024-01-15 ENCOUNTER — OFFICE VISIT (OUTPATIENT)
Dept: FAMILY MEDICINE CLINIC | Facility: CLINIC | Age: 64
End: 2024-01-15
Payer: COMMERCIAL

## 2024-01-15 ENCOUNTER — TELEPHONE (OUTPATIENT)
Dept: FAMILY MEDICINE CLINIC | Facility: CLINIC | Age: 64
End: 2024-01-15
Payer: COMMERCIAL

## 2024-01-15 VITALS
HEART RATE: 82 BPM | WEIGHT: 211 LBS | RESPIRATION RATE: 18 BRPM | TEMPERATURE: 97.8 F | BODY MASS INDEX: 27.96 KG/M2 | SYSTOLIC BLOOD PRESSURE: 122 MMHG | OXYGEN SATURATION: 95 % | HEIGHT: 73 IN | DIASTOLIC BLOOD PRESSURE: 78 MMHG

## 2024-01-15 DIAGNOSIS — J01.90 ACUTE BACTERIAL SINUSITIS: ICD-10-CM

## 2024-01-15 DIAGNOSIS — L98.9 SKIN LESION: ICD-10-CM

## 2024-01-15 DIAGNOSIS — H92.09 EARACHE: Primary | ICD-10-CM

## 2024-01-15 DIAGNOSIS — E66.3 OVERWEIGHT (BMI 25.0-29.9): ICD-10-CM

## 2024-01-15 DIAGNOSIS — B96.89 ACUTE BACTERIAL SINUSITIS: ICD-10-CM

## 2024-01-15 PROCEDURE — 99213 OFFICE O/P EST LOW 20 MIN: CPT | Performed by: FAMILY MEDICINE

## 2024-01-15 RX ORDER — CEPHALEXIN 500 MG/1
500 CAPSULE ORAL 3 TIMES DAILY
Qty: 30 CAPSULE | Refills: 0 | Status: SHIPPED | OUTPATIENT
Start: 2024-01-15

## 2024-01-15 RX ORDER — AZITHROMYCIN 250 MG/1
TABLET, FILM COATED ORAL
Qty: 6 TABLET | Refills: 0 | Status: CANCELLED | OUTPATIENT
Start: 2024-01-15

## 2024-01-15 NOTE — TELEPHONE ENCOUNTER
I attempted to call patient to schedule patient a physical with Dr Hawk, and to follow up Spot by eye. No answer. Left VM with details per verbal.

## 2024-01-15 NOTE — PROGRESS NOTES
Subjective   Americo Reyes is a 64 y.o. male.     Chief Complaint   Patient presents with    Earache       Earache   There is pain in the right ear. This is a recurrent problem. The current episode started 1 to 4 weeks ago. The problem occurs intermittently. The problem has been worsening. There has been no fever. The pain is severe. Associated symptoms include headaches and rhinorrhea. Pertinent negatives include no abdominal pain. He has tried nothing for the symptoms. The treatment provided no relief.            I personally reviewed and updated the patient's allergies, medications, problem list, and past medical, surgical, social, and family history. I have reviewed and confirmed the accuracy of the History of Present Illness and Review of Symptoms as documented by the MA/LPN/RN. Kumar Unger MD    Family History   Problem Relation Age of Onset    Prostate cancer Father         bladder    Heart attack Mother     Stroke Mother        Social History     Tobacco Use    Smoking status: Never    Smokeless tobacco: Never   Vaping Use    Vaping Use: Never used   Substance Use Topics    Alcohol use: No    Drug use: No       Past Surgical History:   Procedure Laterality Date    CYST REMOVAL Right 2007    benign on right thigh    ENDOSCOPY  09/10/2018    with biopsy and port removal dr. sarmiento    ESOPHAGECTOMY  2015    and gastric pull through     VARICOSE VEIN SURGERY Left 2006       Patient Active Problem List   Diagnosis    Adenocarcinoma distal esophagus stage IIIA    Gastroesophageal reflux disease    Abnormal CT of the chest with questionable aspiration    History of chemotherapy    History of esophageal cancer    Pulmonary nodules/lesions, multiple    Hyperlipidemia    Overweight (BMI 25.0-29.9)    Neuropathic pain of chest    Encounter to establish care with new doctor    Memory difficulties    Acute bacterial sinusitis    Skin lesion         Current Outpatient Medications:     aspirin 81 MG tablet,  "ASPIRIN 81 MG ORAL TABLET, Disp: , Rfl:     atorvastatin (LIPITOR) 20 MG tablet, TK 1 T PO QHS (Patient not taking: Reported on 1/15/2024), Disp: , Rfl: 0    calcium carbonate (TUMS) 500 MG chewable tablet, Chew 1 tablet Daily. (Patient not taking: Reported on 1/15/2024), Disp: , Rfl:     cephalexin (KEFLEX) 500 MG capsule, Take 1 capsule by mouth 3 (Three) Times a Day., Disp: 30 capsule, Rfl: 0    gabapentin (NEURONTIN) 300 MG capsule, TAKE 1 CAPSULE BY MOUTH EVERY NIGHT. MAY INCREASE TO 2 AT BEDTIME AS NEEDED FOR PAIN (Patient not taking: Reported on 1/15/2024), Disp: 60 capsule, Rfl: 0    niacin (NIACIN SR,NIASPAN ER) 500 MG CR capsule, Daily. (Patient not taking: Reported on 1/15/2024), Disp: , Rfl:     omeprazole (priLOSEC) 40 MG capsule, Take 1 capsule by mouth Daily. (Patient not taking: Reported on 1/15/2024), Disp: 90 capsule, Rfl: 1         Review of Systems   Constitutional:  Negative for chills and diaphoresis.   HENT:  Positive for ear pain and rhinorrhea. Negative for congestion and tinnitus.    Eyes:  Negative for visual disturbance.   Respiratory:  Negative for shortness of breath.    Cardiovascular:  Negative for chest pain and palpitations.   Gastrointestinal:  Negative for abdominal pain and nausea.   Endocrine: Negative for polydipsia and polyphagia.   Musculoskeletal:  Negative for neck stiffness.   Skin:  Negative for color change and pallor.   Neurological:  Negative for dizziness, seizures and syncope.   Hematological:  Negative for adenopathy.       I have reviewed and confirmed the accuracy of the ROS as documented by the MA/LPN/RN Kumar Unger MD      Objective   /78 (BP Location: Left arm, Patient Position: Sitting, Cuff Size: Large Adult)   Pulse 82   Temp 97.8 °F (36.6 °C)   Resp 18   Ht 184.2 cm (72.5\")   Wt 95.7 kg (211 lb)   SpO2 95% Comment: room air  BMI 28.22 kg/m²   BP Readings from Last 3 Encounters:   01/15/24 122/78   07/29/22 114/78   06/28/22 118/80     Wt " Readings from Last 3 Encounters:   01/15/24 95.7 kg (211 lb)   07/29/22 87.3 kg (192 lb 6.4 oz)   06/28/22 88.8 kg (195 lb 12.8 oz)     Physical Exam  Constitutional:       Appearance: Normal appearance. He is well-developed. He is not diaphoretic.   HENT:      Head: Normocephalic.      Right Ear: Hearing, tympanic membrane, ear canal and external ear normal. A middle ear effusion is present. Tympanic membrane is erythematous.      Left Ear: Hearing, tympanic membrane, ear canal and external ear normal. A middle ear effusion is present. Tympanic membrane is erythematous.      Nose: Nose normal. No mucosal edema or congestion.      Right Sinus: No maxillary sinus tenderness or frontal sinus tenderness.      Left Sinus: No maxillary sinus tenderness or frontal sinus tenderness.      Mouth/Throat:      Mouth: No oral lesions.      Pharynx: Uvula midline. No oropharyngeal exudate or posterior oropharyngeal erythema.      Tonsils: No tonsillar exudate or tonsillar abscesses. 1+ on the right. 1+ on the left.   Eyes:      General: Lids are normal.      Conjunctiva/sclera: Conjunctivae normal.      Pupils: Pupils are equal, round, and reactive to light.   Neck:      Meningeal: Brudzinski's sign and Kernig's sign absent.   Cardiovascular:      Rate and Rhythm: Normal rate and regular rhythm.      Pulses: Normal pulses.      Heart sounds: Normal heart sounds, S1 normal and S2 normal. No murmur heard.     No friction rub. No gallop.   Pulmonary:      Effort: Pulmonary effort is normal. No accessory muscle usage or respiratory distress.      Breath sounds: Normal breath sounds. No stridor. Examination of the right-upper field reveals wheezing and rhonchi. Examination of the left-upper field reveals wheezing and rhonchi. Examination of the right-middle field reveals wheezing and rhonchi. Examination of the left-middle field reveals wheezing and rhonchi. Examination of the right-lower field reveals wheezing and rhonchi.  "Examination of the left-lower field reveals wheezing and rhonchi. No decreased breath sounds, wheezing, rhonchi or rales.   Abdominal:      General: Bowel sounds are normal. There is no distension.      Palpations: Abdomen is soft. Abdomen is not rigid. There is no mass or pulsatile mass.      Tenderness: There is no abdominal tenderness. There is no guarding or rebound. Negative signs include Bell's sign.      Hernia: No hernia is present.   Skin:     General: Skin is warm and dry.      Coloration: Skin is not pale.   Neurological:      Mental Status: He is alert and oriented to person, place, and time.      Cranial Nerves: No cranial nerve deficit.      Coordination: Coordination normal.      Gait: Gait normal.         Data / Lab Results:    No results found for: \"HGBA1C\"     Lab Results   Component Value Date     (H) 08/01/2022     No results found for: \"CHOL\"  Lab Results   Component Value Date    TRIG 190 (H) 08/01/2022     Lab Results   Component Value Date    HDL 48 08/01/2022     No results found for: \"PSA\"  Lab Results   Component Value Date    WBC 5.63 02/06/2023    HGB 15.1 02/06/2023    HCT 45.8 02/06/2023    MCV 87.1 02/06/2023     02/06/2023     Lab Results   Component Value Date    TSH 4.320 08/01/2022      Lab Results   Component Value Date    GLUCOSE 83 02/06/2023    BUN 25 (H) 02/06/2023    CREATININE 0.97 02/06/2023    EGFRIFNONA 77 09/19/2019    EGFRIFAFRI >60 09/04/2018    BCR 25.8 (H) 02/06/2023    K 4.6 02/06/2023    CO2 31.2 (H) 02/06/2023    CALCIUM 9.4 02/06/2023    PROTENTOTREF 6.4 08/01/2022    ALBUMIN 4.2 02/06/2023    LABIL2 1.9 08/01/2022    AST 21 02/06/2023    ALT 16 02/06/2023     No results found for: \"ANAHI\", \"RF\", \"SEDRATE\"   No results found for: \"CRP\"   Lab Results   Component Value Date    FERRITIN 48 11/20/2017      Lab Results   Component Value Date    EMVCJNPM22 550 08/01/2022          Assessment & Plan      Medications        Problem List         LOS    Acute " bacterial sinsusitis.  Start antibiotics.  Increase fluid intake  Call / return if Increase fluid intake worsening symptoms.   Skin lesion right eye.  Enlarging, worse in winter per his wife's report.  recommend dermatology referrall to Dr. Martin/ consider resection, he agrees to schedule.   Recommend follow up visit for comprehensive physical exam and screening tests with his PCP        Diagnoses and all orders for this visit:    1. Earache (Primary)  -     cephalexin (KEFLEX) 500 MG capsule; Take 1 capsule by mouth 3 (Three) Times a Day.  Dispense: 30 capsule; Refill: 0    2. Overweight (BMI 25.0-29.9)    3. Acute bacterial sinusitis    4. Skin lesion      BMI is >= 25 and <30. (Overweight) The following options were offered after discussion;: exercise counseling/recommendations and nutrition counseling/recommendations          Expected course, medications, and adverse effects discussed.  Call or return if worsening or persistent symptoms.  I wore protective equipment throughout this patient encounter including a mask, gloves, and eye protection.  Hand hygiene was performed before donning protective equipment and after removal when leaving the room. The complete contents of the Assessment and Plan and Data/Lab Results as documented above have been reviewed and addressed by myself with the patient today as part of an ongoing evaluation / treatment plan.  If some of the documentation has been copied from a previous note and is unchanged it indicates that this problem / plan has been assessed today but is stable from a previous visit and no changes have been recommended.

## 2024-01-16 PROBLEM — B96.89 ACUTE BACTERIAL SINUSITIS: Status: ACTIVE | Noted: 2024-01-16

## 2024-01-16 PROBLEM — L98.9 SKIN LESION: Status: ACTIVE | Noted: 2024-01-16

## 2024-01-16 PROBLEM — J01.90 ACUTE BACTERIAL SINUSITIS: Status: ACTIVE | Noted: 2024-01-16

## 2025-03-24 ENCOUNTER — OFFICE VISIT (OUTPATIENT)
Dept: FAMILY MEDICINE CLINIC | Facility: CLINIC | Age: 65
End: 2025-03-24
Payer: COMMERCIAL

## 2025-03-24 VITALS
HEART RATE: 98 BPM | RESPIRATION RATE: 18 BRPM | SYSTOLIC BLOOD PRESSURE: 128 MMHG | TEMPERATURE: 98.2 F | BODY MASS INDEX: 27.46 KG/M2 | OXYGEN SATURATION: 92 % | DIASTOLIC BLOOD PRESSURE: 82 MMHG | WEIGHT: 207.2 LBS | HEIGHT: 73 IN

## 2025-03-24 DIAGNOSIS — Z12.5 SCREENING FOR PROSTATE CANCER: ICD-10-CM

## 2025-03-24 DIAGNOSIS — Z85.01 HISTORY OF ESOPHAGEAL CANCER: ICD-10-CM

## 2025-03-24 DIAGNOSIS — R19.5 LOOSE STOOLS: Primary | ICD-10-CM

## 2025-03-24 DIAGNOSIS — E78.2 MIXED HYPERLIPIDEMIA: ICD-10-CM

## 2025-03-24 PROBLEM — C15.9 MALIGNANT NEOPLASM OF ESOPHAGUS: Status: ACTIVE | Noted: 2025-03-24

## 2025-03-24 PROBLEM — Z76.89 ENCOUNTER TO ESTABLISH CARE WITH NEW DOCTOR: Status: RESOLVED | Noted: 2022-07-14 | Resolved: 2025-03-24

## 2025-03-24 PROCEDURE — 99214 OFFICE O/P EST MOD 30 MIN: CPT | Performed by: FAMILY MEDICINE

## 2025-03-24 RX ORDER — COLESTIPOL HYDROCHLORIDE 5 G/5G
5 GRANULE, FOR SUSPENSION ORAL 2 TIMES DAILY
Qty: 60 EACH | Refills: 6 | Status: SHIPPED | OUTPATIENT
Start: 2025-03-24

## 2025-03-24 NOTE — PROGRESS NOTES
Chief Complaint  Diarrhea    Subjective     CC  Problem List  Visit Diagnosis   Encounters  Notes  Medications  Labs  Result Review Imaging  Media    Americo Reyes presents to [facility] for   History of Present Illness  Mr Reyes has hx of esophogeal cancer he is doing well with no upper GI sxs.   Diarrhea   This is a recurrent problem. The current episode started more than 1 month ago. The problem occurs 2 to 4 times per day. The problem has been waxing and waning. Pertinent negatives include no abdominal pain, bloating, chills, fever, headaches, increased  flatus, myalgias, sweats, vomiting or weight loss. Associated symptoms comments: Low abdominal cramping prior to loose stool. Nothing aggravates the symptoms. There are no known risk factors. He has tried nothing for the symptoms.   Hyperlipidemia  This is a chronic problem. The current episode started more than 1 year ago. The problem is resistant. He has no history of chronic renal disease, hypothyroidism or liver disease. Pertinent negatives include no chest pain, myalgias or shortness of breath.     Review of Systems   Constitutional:  Negative for chills, fever and unexpected weight loss.   Eyes:  Negative for visual disturbance.   Respiratory:  Negative for shortness of breath.    Cardiovascular:  Negative for chest pain and palpitations.   Gastrointestinal:  Positive for diarrhea (on average 3 loose stools per day, no blood no mucous.). Negative for abdominal pain, bloating, flatus and vomiting.   Endocrine: Negative for cold intolerance, heat intolerance, polydipsia and polyuria.   Musculoskeletal:  Negative for myalgias.   Skin:  Negative for rash.   Hematological:  Negative for adenopathy. Does not bruise/bleed easily.        Objective   Vital Signs:   /82 (BP Location: Left arm, Patient Position: Sitting, Cuff Size: Adult)   Pulse 98   Temp 98.2 °F (36.8 °C) (Temporal)   Resp 18   Ht 184.2 cm  "(72.5\")   Wt 94 kg (207 lb 3.2 oz)   SpO2 92%   BMI 27.72 kg/m²     Physical Exam  Constitutional:       General: He is not in acute distress.  Cardiovascular:      Rate and Rhythm: Normal rate and regular rhythm.      Pulses: Normal pulses.      Heart sounds: No murmur heard.  Pulmonary:      Effort: Pulmonary effort is normal.      Breath sounds: Normal breath sounds.   Abdominal:      General: Bowel sounds are normal. There is no distension.      Palpations: Abdomen is soft. There is no hepatomegaly, splenomegaly or mass.      Tenderness: There is no abdominal tenderness. There is no right CVA tenderness or left CVA tenderness.   Musculoskeletal:      Cervical back: Neck supple.      Right lower leg: No edema.      Left lower leg: No edema.   Lymphadenopathy:      Cervical: No cervical adenopathy.   Skin:     Findings: No rash.   Neurological:      Mental Status: He is alert.        Result Review :Labs  Result Review  Imaging  Med Tab  Media                 Assessment and Plan CC Problem List  Visit Diagnosis  ROS  Review (Popup)  Health Maintenance  Quality  BestPractice  Medications  SmartSets  SnapShot Encounters  Media  Problem List Items Addressed This Visit          Unprioritized    History of esophageal cancer    Overview   2014 Presently not in oncology or GI follow up. No sxs.          Hyperlipidemia    Current Assessment & Plan    Lipid abnormalities are stable    Plan:  Continue same medication/s without change.      Discussed medication dosage, use, side effects, and goals of treatment in detail.    Counseled patient on lifestyle modifications to help control hyperlipidemia.     Patient Treatment Goals:   LDL goal is less than 70    Followup in 2 weeks.         Relevant Medications    colestipol (Colestid) 5 g granules    Other Relevant Orders    Lipid Panel With / Chol / HDL Ratio (Completed)    TSH (Completed)     Other Visit Diagnoses         Loose stools    -  Primary    " etiology uncertain, neg exam, verbal hx of colonoscopy recently, he is not sure where, he will research, we can't find, lab rx sxs f.u 2 wks.    Relevant Medications    colestipol (Colestid) 5 g granules    Other Relevant Orders    CBC & Differential (Completed)    Comprehensive Metabolic Panel (Completed)      Screening for prostate cancer        Relevant Orders    PSA Screen (Completed)            Follow Up Instructions Charge Capture  Follow-up Communications  Return in about 2 weeks (around 4/7/2025).  Patient was given instructions and counseling regarding his condition or for health maintenance advice. Please see specific information pulled into the AVS if appropriate.

## 2025-03-26 LAB
ALBUMIN SERPL-MCNC: 4.2 G/DL (ref 3.9–4.9)
ALP SERPL-CCNC: 88 IU/L (ref 44–121)
ALT SERPL-CCNC: 10 IU/L (ref 0–44)
AST SERPL-CCNC: 16 IU/L (ref 0–40)
BASOPHILS # BLD AUTO: 0 X10E3/UL (ref 0–0.2)
BASOPHILS NFR BLD AUTO: 1 %
BILIRUB SERPL-MCNC: 0.5 MG/DL (ref 0–1.2)
BUN SERPL-MCNC: 20 MG/DL (ref 8–27)
BUN/CREAT SERPL: 24 (ref 10–24)
CALCIUM SERPL-MCNC: 10.3 MG/DL (ref 8.6–10.2)
CHLORIDE SERPL-SCNC: 102 MMOL/L (ref 96–106)
CHOLEST SERPL-MCNC: 214 MG/DL (ref 100–199)
CHOLEST/HDLC SERPL: 4.6 RATIO (ref 0–5)
CO2 SERPL-SCNC: 23 MMOL/L (ref 20–29)
CREAT SERPL-MCNC: 0.85 MG/DL (ref 0.76–1.27)
EGFRCR SERPLBLD CKD-EPI 2021: 98 ML/MIN/1.73
EOSINOPHIL # BLD AUTO: 0.1 X10E3/UL (ref 0–0.4)
EOSINOPHIL NFR BLD AUTO: 1 %
ERYTHROCYTE [DISTWIDTH] IN BLOOD BY AUTOMATED COUNT: 13.5 % (ref 11.6–15.4)
GLOBULIN SER CALC-MCNC: 2.5 G/DL (ref 1.5–4.5)
GLUCOSE SERPL-MCNC: 97 MG/DL (ref 70–99)
HCT VFR BLD AUTO: 49.6 % (ref 37.5–51)
HDLC SERPL-MCNC: 47 MG/DL
HGB BLD-MCNC: 16.2 G/DL (ref 13–17.7)
IMM GRANULOCYTES # BLD AUTO: 0 X10E3/UL (ref 0–0.1)
IMM GRANULOCYTES NFR BLD AUTO: 1 %
LDLC SERPL CALC-MCNC: 132 MG/DL (ref 0–99)
LYMPHOCYTES # BLD AUTO: 1.1 X10E3/UL (ref 0.7–3.1)
LYMPHOCYTES NFR BLD AUTO: 27 %
MCH RBC QN AUTO: 28 PG (ref 26.6–33)
MCHC RBC AUTO-ENTMCNC: 32.7 G/DL (ref 31.5–35.7)
MCV RBC AUTO: 86 FL (ref 79–97)
MONOCYTES # BLD AUTO: 0.6 X10E3/UL (ref 0.1–0.9)
MONOCYTES NFR BLD AUTO: 14 %
NEUTROPHILS # BLD AUTO: 2.4 X10E3/UL (ref 1.4–7)
NEUTROPHILS NFR BLD AUTO: 56 %
PLATELET # BLD AUTO: 249 X10E3/UL (ref 150–450)
POTASSIUM SERPL-SCNC: 4.7 MMOL/L (ref 3.5–5.2)
PROT SERPL-MCNC: 6.7 G/DL (ref 6–8.5)
PSA SERPL-MCNC: 0.9 NG/ML (ref 0–4)
RBC # BLD AUTO: 5.78 X10E6/UL (ref 4.14–5.8)
SODIUM SERPL-SCNC: 140 MMOL/L (ref 134–144)
TRIGL SERPL-MCNC: 197 MG/DL (ref 0–149)
TSH SERPL DL<=0.005 MIU/L-ACNC: 2.74 UIU/ML (ref 0.45–4.5)
VLDLC SERPL CALC-MCNC: 35 MG/DL (ref 5–40)
WBC # BLD AUTO: 4.2 X10E3/UL (ref 3.4–10.8)

## 2025-03-29 NOTE — ASSESSMENT & PLAN NOTE
Lipid abnormalities are stable    Plan:  Continue same medication/s without change.      Discussed medication dosage, use, side effects, and goals of treatment in detail.    Counseled patient on lifestyle modifications to help control hyperlipidemia.     Patient Treatment Goals:   LDL goal is less than 70    Followup in 2 weeks.

## 2025-04-23 NOTE — PROGRESS NOTES
Chief Complaint  Diarrhea    History of Present Illness  Americo Reyes presents today for follow up on diarrhea    Diarrhea: Onset of diarrhea was 2 months ago. Diarrhea is occurring approximately 0 times per day. Patient denies blood in stool, significant abdominal pain, unintentional weight loss.  Previous visits for diarrhea: yes, last seen 1 month ago by Dr. Mireles . Evaluation to date: lab work. Treatment to date: colestipol 5g.     History of Present Illness  The patient is a 65-year-old male who presents for follow-up of loose stools.    Loose stools were initially evaluated by Dr. Mireles last month. The condition has since resolved following the administration of colestipol, which he discontinued after symptoms improved. No new health concerns are reported at present. He does not recall undergoing any stool testing or recent colonoscopy but mentions that blood work was conducted.    Patient has A history of esophageal cancer, currently in remission, is noted. He has not consulted with Dr. Flaherty recently but reports no issues or problems. No testing has been done since his last visit with Dr. Flaherty several years ago. An EGD was performed by Dr. Mcnally in 2014, but does not recall follow-up EGDs have been conducted with Dr. Mcnally or any other gastroenterologist.  He was previously under the care of a cardiologist, who relocated to [place], and was informed that his condition was stable. Visits to the cardiologist were discontinued. The cardiologist evaluated him during his hospital stay for esophageal cancer surgery in 2015.    He was previously on Lipitor and Niaspan but discontinued these medications approximately a year ago as he believed they were not providing any benefits. He is currently on baby aspirin and reports no previous side effects from Crestor.    Despite not remembering previous physicians names and treatments and timeline, or recalling previous referrals that I placed on last  "appointment 2-1/2 years ago and missing appointments that were scheduled, patient states that he is not personally concerned with his memory and he has not discussed more issues with any healthcare provider. He uses his phone, calendar, and alarms to remind himself of tasks.    SOCIAL HISTORY  He does not smoke. He is currently employed at [employer], where he engages in extensive walking, providing him with some physical exercise. He is  and lives with his wife.      Patient Care Team:  Yun Hawk MD as PCP - General (Family Medicine)   Current Outpatient Medications on File Prior to Visit   Medication Sig    aspirin 81 MG tablet ASPIRIN 81 MG ORAL TABLET    colestipol (Colestid) 5 g granules Take 5 g by mouth 2 (Two) Times a Day. (Patient not taking: Reported on 4/25/2025)    niacin (NIACIN SR,NIASPAN ER) 500 MG CR capsule Daily. (Patient not taking: Reported on 1/15/2024)    [DISCONTINUED] atorvastatin (LIPITOR) 20 MG tablet TK 1 T PO QHS (Patient not taking: Reported on 1/15/2024)     No current facility-administered medications on file prior to visit.       Objective   Vital Signs:   /74 (BP Location: Left arm, Patient Position: Sitting, Cuff Size: Adult)   Pulse 52   Temp 97.7 °F (36.5 °C) (Temporal)   Resp 18   Ht 182.9 cm (72\")   Wt 94.3 kg (208 lb)   SpO2 95%   BMI 28.21 kg/m²    BP Readings from Last 3 Encounters:   04/25/25 112/74   03/24/25 128/82   01/15/24 122/78     Wt Readings from Last 3 Encounters:   04/25/25 94.3 kg (208 lb)   03/24/25 94 kg (207 lb 3.2 oz)   01/15/24 95.7 kg (211 lb)         Physical Exam  Vitals and nursing note reviewed.   Constitutional:       General: He is not in acute distress.     Appearance: Normal appearance. He is well-developed. He is not ill-appearing.   HENT:      Head: Normocephalic and atraumatic.   Eyes:      Conjunctiva/sclera: Conjunctivae normal.      Pupils: Pupils are equal, round, and reactive to light.   Neck:      Thyroid: No " thyromegaly.      Vascular: No JVD.      Comments: No thyromegaly or palpable thyroid masses  Cardiovascular:      Rate and Rhythm: Normal rate and regular rhythm.      Heart sounds: Normal heart sounds. No murmur heard.  Pulmonary:      Breath sounds: Normal breath sounds. No wheezing or rales.   Musculoskeletal:         General: Normal range of motion.      Cervical back: Normal range of motion and neck supple. No tenderness.      Right lower leg: No edema.      Left lower leg: No edema.   Lymphadenopathy:      Cervical: No cervical adenopathy.   Skin:     General: Skin is warm and dry.      Findings: No rash.   Neurological:      Mental Status: He is alert and oriented to person, place, and time.   Psychiatric:         Mood and Affect: Mood normal.         Behavior: Behavior normal.      Comments: Memory seems impaired          Physical Exam        No visits with results within 1 Day(s) from this visit.   Latest known visit with results is:   Office Visit on 03/24/2025   Component Date Value Ref Range Status    WBC 03/25/2025 4.2  3.4 - 10.8 x10E3/uL Final    RBC 03/25/2025 5.78  4.14 - 5.80 x10E6/uL Final    Hemoglobin 03/25/2025 16.2  13.0 - 17.7 g/dL Final    Hematocrit 03/25/2025 49.6  37.5 - 51.0 % Final    MCV 03/25/2025 86  79 - 97 fL Final    MCH 03/25/2025 28.0  26.6 - 33.0 pg Final    MCHC 03/25/2025 32.7  31.5 - 35.7 g/dL Final    RDW 03/25/2025 13.5  11.6 - 15.4 % Final    Platelets 03/25/2025 249  150 - 450 x10E3/uL Final    Neutrophil Rel % 03/25/2025 56  Not Estab. % Final    Lymphocyte Rel % 03/25/2025 27  Not Estab. % Final    Monocyte Rel % 03/25/2025 14  Not Estab. % Final    Eosinophil Rel % 03/25/2025 1  Not Estab. % Final    Basophil Rel % 03/25/2025 1  Not Estab. % Final    Neutrophils Absolute 03/25/2025 2.4  1.4 - 7.0 x10E3/uL Final    Lymphocytes Absolute 03/25/2025 1.1  0.7 - 3.1 x10E3/uL Final    Monocytes Absolute 03/25/2025 0.6  0.1 - 0.9 x10E3/uL Final    Eosinophils Absolute  03/25/2025 0.1  0.0 - 0.4 x10E3/uL Final    Basophils Absolute 03/25/2025 0.0  0.0 - 0.2 x10E3/uL Final    Immature Granulocyte Rel % 03/25/2025 1  Not Estab. % Final    Immature Grans Absolute 03/25/2025 0.0  0.0 - 0.1 x10E3/uL Final    Glucose 03/25/2025 97  70 - 99 mg/dL Final    BUN 03/25/2025 20  8 - 27 mg/dL Final    Creatinine 03/25/2025 0.85  0.76 - 1.27 mg/dL Final    EGFR Result 03/25/2025 98  >59 mL/min/1.73 Final    BUN/Creatinine Ratio 03/25/2025 24  10 - 24 Final    Sodium 03/25/2025 140  134 - 144 mmol/L Final    Potassium 03/25/2025 4.7  3.5 - 5.2 mmol/L Final    Chloride 03/25/2025 102  96 - 106 mmol/L Final    Total CO2 03/25/2025 23  20 - 29 mmol/L Final    Calcium 03/25/2025 10.3 (H)  8.6 - 10.2 mg/dL Final    Total Protein 03/25/2025 6.7  6.0 - 8.5 g/dL Final    Albumin 03/25/2025 4.2  3.9 - 4.9 g/dL Final    Globulin 03/25/2025 2.5  1.5 - 4.5 g/dL Final    Total Bilirubin 03/25/2025 0.5  0.0 - 1.2 mg/dL Final    Alkaline Phosphatase 03/25/2025 88  44 - 121 IU/L Final    AST (SGOT) 03/25/2025 16  0 - 40 IU/L Final    ALT (SGPT) 03/25/2025 10  0 - 44 IU/L Final    Total Cholesterol 03/25/2025 214 (H)  100 - 199 mg/dL Final    Triglycerides 03/25/2025 197 (H)  0 - 149 mg/dL Final    HDL Cholesterol 03/25/2025 47  >39 mg/dL Final    VLDL Cholesterol Juanjo 03/25/2025 35  5 - 40 mg/dL Final    LDL Chol Calc (NIH) 03/25/2025 132 (H)  0 - 99 mg/dL Final    Chol/HDL Ratio 03/25/2025 4.6  0.0 - 5.0 ratio Final    Comment:                                   T. Chol/HDL Ratio                                              Men  Women                                1/2 Avg.Risk  3.4    3.3                                    Avg.Risk  5.0    4.4                                 2X Avg.Risk  9.6    7.1                                 3X Avg.Risk 23.4   11.0      TSH 03/25/2025 2.740  0.450 - 4.500 uIU/mL Final    PSA 03/25/2025 0.9  0.0 - 4.0 ng/mL Final    Comment: Roche ECLIA methodology.  According to the  American Urological Association, Serum PSA should  decrease and remain at undetectable levels after radical  prostatectomy. The AUA defines biochemical recurrence as an initial  PSA value 0.2 ng/mL or greater followed by a subsequent confirmatory  PSA value 0.2 ng/mL or greater.  Values obtained with different assay methods or kits cannot be used  interchangeably. Results cannot be interpreted as absolute evidence  of the presence or absence of malignant disease.         Lab Results   Component Value Date    CHLPL 214 (H) 03/25/2025    TRIG 197 (H) 03/25/2025    HDL 47 03/25/2025     (H) 03/25/2025     Lab Results   Component Value Date    TSH 2.740 03/25/2025     Lab Results   Component Value Date    GLUCOSE 97 03/25/2025    BUN 20 03/25/2025    CREATININE 0.85 03/25/2025    EGFRIFNONA 77 09/19/2019    EGFRIFAFRI >60 09/04/2018    BCR 24 03/25/2025    K 4.7 03/25/2025    CO2 23 03/25/2025    CALCIUM 10.3 (H) 03/25/2025    ALBUMIN 4.2 03/25/2025    AST 16 03/25/2025    ALT 10 03/25/2025     Lab Results   Component Value Date    WBC 4.2 03/25/2025    HGB 16.2 03/25/2025    HCT 49.6 03/25/2025    MCV 86 03/25/2025     03/25/2025         The 10-year ASCVD risk score (Shameka DENNY, et al., 2019) is: 9.6%    Values used to calculate the score:      Age: 65 years      Sex: Male      Is Non- : No      Diabetic: No      Tobacco smoker: No      Systolic Blood Pressure: 112 mmHg      Is BP treated: No      HDL Cholesterol: 47 mg/dL      Total Cholesterol: 214 mg/dL    Results  Labs   - Total cholesterol: Normal   - HDL cholesterol: Normal             Assessment and Plan    Diagnoses and all orders for this visit:    1. Loose stools (Primary)    2. Overweight (BMI 25.0-29.9)    3. History of esophageal cancer  -     Ambulatory Referral to Hematology / Oncology  -     Ambulatory Referral to Gastroenterology    4. Mixed hyperlipidemia  -     atorvastatin (LIPITOR) 20 MG tablet; Take 1 tablet by  mouth Every Night.  Dispense: 90 tablet; Refill: 3    5. Screening for colon cancer  -     Ambulatory Referral to Gastroenterology        Assessment & Plan  1. Loose stools.  His condition has improved following the administration of colestipol. He has discontinued the medication as he believes it is no longer necessary.    2. Esophageal cancer.  He has not had any follow-up appointments with Dr. Flaherty  or any other oncologist since his last visit in 2016. A referral to  will be initiated for further evaluation and management. He is advised to adhere to scheduled appointments to avoid potential rescheduling issues.  Also placing referral to gastroenterology, Dr. Gardner or someone else in this group both for follow-up on esophageal cancer and need for screening colonoscopy.    3. Hypercholesterolemia.  His total cholesterol and HDL cholesterol levels are within normal range. However, given his age, gender, and ethnicity, coupled with the absence of diabetes and smoking habits, his calculated 10-year risk for a heart attack or stroke is approximately 9.6 percent. This risk level necessitates the initiation of statin therapy to mitigate future cardiovascular risks. A prescription for Crestor 20 mg will be sent to [facility]. He is also advised to continue his current regimen of baby aspirin.    4. Memory concerns.  Probable memory impairment.  With discussion he has expressed concerns about his memory and uses various tools like his phone and calendar to help remember tasks. A memory screen will be conducted during his annual visit to assess if further evaluation or treatment is needed.    Follow-up  The patient will follow up in 1 month annual wellness exam, memory testing, and to confirm he is scheduling with specialists as above.      Medications Discontinued During This Encounter   Medication Reason    atorvastatin (LIPITOR) 20 MG tablet Reorder         Follow Up     Return in about 1  month (around 5/25/2025) for Annual physical and memory screen.    Patient was given instructions and counseling regarding his condition or for health maintenance advice. Please see specific information pulled into the AVS if appropriate.     Patient or patient representative verbalized consent for the use of Ambient Listening during the visit with  Yun Hawk MD for chart documentation. 4/25/2025  12:07 EDT

## 2025-04-25 ENCOUNTER — TELEPHONE (OUTPATIENT)
Dept: FAMILY MEDICINE CLINIC | Facility: CLINIC | Age: 65
End: 2025-04-25

## 2025-04-25 ENCOUNTER — OFFICE VISIT (OUTPATIENT)
Dept: FAMILY MEDICINE CLINIC | Facility: CLINIC | Age: 65
End: 2025-04-25
Payer: COMMERCIAL

## 2025-04-25 VITALS
HEIGHT: 72 IN | OXYGEN SATURATION: 95 % | HEART RATE: 52 BPM | SYSTOLIC BLOOD PRESSURE: 112 MMHG | RESPIRATION RATE: 18 BRPM | BODY MASS INDEX: 28.17 KG/M2 | WEIGHT: 208 LBS | DIASTOLIC BLOOD PRESSURE: 74 MMHG | TEMPERATURE: 97.7 F

## 2025-04-25 DIAGNOSIS — E78.2 MIXED HYPERLIPIDEMIA: ICD-10-CM

## 2025-04-25 DIAGNOSIS — Z85.01 HISTORY OF ESOPHAGEAL CANCER: ICD-10-CM

## 2025-04-25 DIAGNOSIS — Z12.11 SCREENING FOR COLON CANCER: ICD-10-CM

## 2025-04-25 DIAGNOSIS — E66.3 OVERWEIGHT (BMI 25.0-29.9): ICD-10-CM

## 2025-04-25 DIAGNOSIS — R19.5 LOOSE STOOLS: Primary | ICD-10-CM

## 2025-04-25 RX ORDER — ATORVASTATIN CALCIUM 20 MG/1
20 TABLET, FILM COATED ORAL NIGHTLY
Qty: 90 TABLET | Refills: 3 | Status: SHIPPED | OUTPATIENT
Start: 2025-04-25

## 2025-04-25 NOTE — TELEPHONE ENCOUNTER
Having patient sign records release form today for Dr. Leonel Flaherty's office, needs records from 2022-present. Called Dr. Jang's office to see if they saw the patient when we referred him in 2022 as well. Spoke with Anjali and she confirmed that the patient was last seen in 2016 and no showed their appointment on 4/8/24, he has not rescheduled. Informed PCP during visit today.

## 2025-04-28 ENCOUNTER — TELEPHONE (OUTPATIENT)
Dept: FAMILY MEDICINE CLINIC | Facility: CLINIC | Age: 65
End: 2025-04-28
Payer: COMMERCIAL

## 2025-04-28 NOTE — TELEPHONE ENCOUNTER
"Per Dr. Hawk,  \"Please contact patient and inform we have received a letter from [facility] that their practice is out of his insurance network.  He should contact insurance and determine if out of network benefits are available and if so , he should then contact [facility] to schedule an appointment.  If not, needs to ask insurance which gastroenterology groups are in network so we can place a new referral.\"      Lvm and advised return call. Also sent [employer] message.  "

## 2025-04-30 NOTE — TELEPHONE ENCOUNTER
Attempted to contact the patient for a third time, no answer so left another voicemail advising return call.

## 2025-04-30 NOTE — TELEPHONE ENCOUNTER
I sent him a [email or web address] message on 4/28/25 that he has not read. Would you like me to send him a letter?

## 2025-05-22 NOTE — PROGRESS NOTES
"  Chief Complaint   Patient presents with    Annual Exam       History of Present Illness  Subjective   Americo Reyes is a 65 y.o. male.     History of Present Illness  The patient is a 65-year-old male who presents for an annual physical and memory screen. He has a history of esophageal cancer and has not had follow-up since 2016. Dr. Flaherty has left private practice, and his previous GI specialist, Dr. Kraft, and their group are out of network. New referrals have been placed to hematology, oncology, and gastroenterology, with an appointment scheduled for next Friday.      The patient is here: for coordination of medical care.  Last comprehensive physical was on unkown.  Previous physical was performed by  Yun Hawk MD  Overall has: moderate activity with work/home activities, exercises 2 - 3 times per week, good appetite, feels well with no complaints, good energy level, and is sleeping well. Nutrition: balanced diet. Last tetanus shot was  24 .     ATTENTION  What is the year: correct  What is the month of the year: incorrect  What is the day of the week?: correct  What is the date?: incorrect  MEMORY  Repeat address three times, only score third attempt: [address], [place]: 6  HOW MANY ANIMALS DID THE PATIENT NAME  Verbal Fluency -- Animal Names (0-25): 9-10  CLOCK DRAWING  Clock Drawing: All Correct  MEMORY RECALL  Tell me what you remember about that name and address we were repeating at the beginnin  ACE TOTAL SCORE  Total ACE Score - <25/30 strongly suggests cognitive impairment; <21/30 almost certainly shows dementia: 17     Vitals:    25 0847 25 0849   BP: 158/90 142/88   BP Location: Right arm Right arm   Patient Position: Sitting Sitting   Cuff Size: Adult Adult   Pulse: 82    Resp: 18    Temp: 98.2 °F (36.8 °C)    TempSrc: Temporal    SpO2: 98%    Weight: 94.8 kg (209 lb)    Height: 184.2 cm (72.5\")         Health Maintenance "   Topic Date Due    COLORECTAL CANCER SCREENING  Never done    Pneumococcal Vaccine 50+ (1 of 1 - PCV) Never done    HEPATITIS C SCREENING  Never done    ANNUAL PHYSICAL  Never done    COVID-19 Vaccine (2 - 2024-25 season) 09/20/2025 (Originally 9/1/2024)    INFLUENZA VACCINE  07/01/2025    LIPID PANEL  03/25/2026    TDAP/TD VACCINES (2 - Td or Tdap) 05/14/2034    ZOSTER VACCINE  Completed       PHQ-9 Depression Screening  Little interest or pleasure in doing things? Not at all   Feeling down, depressed, or hopeless? Not at all   Trouble falling or staying asleep, or sleeping too much?     Feeling tired or having little energy?     Poor appetite or overeating?     Feeling bad about yourself - or that you are a failure or have let yourself or your family down?     Trouble concentrating on things, such as reading the newspaper or watching television?     Moving or speaking so slowly that other people could have noticed? Or the opposite - being so fidgety or restless that you have been moving around a lot more than usual?     Thoughts that you would be better off dead, or of hurting yourself in some way?     PHQ-9 Total Score     If you checked off any problems, how difficult have these problems made it for you to do your work, take care of things at home, or get along with other people?               Recent Hospitalizations:  No hospitalization(s) within the last year..  ccc      I personally reviewed and updated the patient's allergies, medications, problem list, and past medical, surgical, social, and family history. I have reviewed and confirmed the accuracy of the HPI and ROS as documented by the MA/LPN/RN Yun Hawk MD    Family History   Problem Relation Age of Onset    Prostate cancer Father         bladder    Heart attack Mother     Stroke Mother        Social History     Tobacco Use    Smoking status: Never    Smokeless tobacco: Never   Vaping Use    Vaping status: Never Used   Substance Use  "Topics    Alcohol use: No    Drug use: No       Past Surgical History:   Procedure Laterality Date    CYST REMOVAL Right 2007    benign on right thigh    ENDOSCOPY  09/10/2018    with biopsy and port removal dr. sarmiento    ESOPHAGECTOMY  2015    and gastric pull through     VARICOSE VEIN SURGERY Left 2006       Patient Active Problem List   Diagnosis    Adenocarcinoma distal esophagus stage IIIA    Gastroesophageal reflux disease    Abnormal CT of the chest with questionable aspiration    History of chemotherapy    History of esophageal cancer    Pulmonary nodules/lesions, multiple    Hyperlipidemia    Overweight (BMI 25.0-29.9)    Neuropathic pain of chest    Memory difficulties    Acute bacterial sinusitis    Skin lesion    Malignant neoplasm of esophagus         Current Outpatient Medications:     aspirin 81 MG tablet, ASPIRIN 81 MG ORAL TABLET, Disp: , Rfl:     atorvastatin (LIPITOR) 20 MG tablet, Take 1 tablet by mouth Every Night., Disp: 90 tablet, Rfl: 3    colestipol (Colestid) 5 g granules, Take 5 g by mouth 2 (Two) Times a Day. (Patient not taking: Reported on 5/27/2025), Disp: 60 each, Rfl: 6    famotidine (PEPCID) 40 MG tablet, Take 1 tablet by mouth Every Night., Disp: 30 tablet, Rfl: 5    niacin (NIACIN SR,NIASPAN ER) 500 MG CR capsule, Daily. (Patient not taking: Reported on 5/27/2025), Disp: , Rfl:     Objective   /88 (BP Location: Right arm, Patient Position: Sitting, Cuff Size: Adult)   Pulse 82   Temp 98.2 °F (36.8 °C) (Temporal)   Resp 18   Ht 184.2 cm (72.5\")   Wt 94.8 kg (209 lb)   SpO2 98%   BMI 27.96 kg/m²   BP Readings from Last 3 Encounters:   05/27/25 142/88   04/25/25 112/74   03/24/25 128/82     Wt Readings from Last 3 Encounters:   05/27/25 94.8 kg (209 lb)   04/25/25 94.3 kg (208 lb)   03/24/25 94 kg (207 lb 3.2 oz)     Physical Exam  Vitals and nursing note reviewed.   Constitutional:       General: He is not in acute distress.     Appearance: Normal appearance. He is " "well-developed. He is not ill-appearing.   HENT:      Head: Normocephalic and atraumatic.      Right Ear: Tympanic membrane, ear canal and external ear normal. There is no impacted cerumen.      Left Ear: Tympanic membrane, ear canal and external ear normal. There is no impacted cerumen.      Ears:      Comments: Moderate amount of flaky wax in both external auditory canals     Mouth/Throat:      Mouth: Mucous membranes are moist.      Pharynx: No posterior oropharyngeal erythema.   Eyes:      Conjunctiva/sclera: Conjunctivae normal.      Pupils: Pupils are equal, round, and reactive to light.   Neck:      Thyroid: No thyromegaly.      Vascular: No JVD.   Cardiovascular:      Rate and Rhythm: Normal rate and regular rhythm.      Heart sounds: Normal heart sounds. No murmur heard.  Pulmonary:      Breath sounds: Normal breath sounds. No wheezing or rales.   Abdominal:      General: Abdomen is flat. Bowel sounds are normal. There is no distension.      Palpations: Abdomen is soft. There is no mass.      Tenderness: There is no abdominal tenderness.      Hernia: No hernia is present.   Musculoskeletal:         General: Normal range of motion.      Cervical back: Normal range of motion and neck supple. No tenderness.      Right lower leg: No edema.      Left lower leg: No edema.      Comments: Wearing knee-high compression hose for reported varicose veins   Lymphadenopathy:      Cervical: No cervical adenopathy.   Skin:     General: Skin is warm and dry.      Findings: No rash.   Neurological:      Mental Status: He is alert and oriented to person, place, and time.   Psychiatric:         Mood and Affect: Mood normal.         Behavior: Behavior normal.      Comments: Cognitive impairment         Physical Exam      Data / Lab Results:  No results found for: \"HGBA1C\"        Results  Labs   - Calcium: Minimally elevated   - Thyroid: Normal   - PSA: Normal    Age-appropriate Screening Schedule:  Refer to the list below for " future screening recommendations based on patient's age, sex and/or medical conditions. Orders for these recommended tests are listed in the plan section. The patient has been provided with a written plan.        Assessment & Plan      Medications        Problem List         LOS        Diagnoses and all orders for this visit:    1. Annual physical exam (Primary)    2. Overweight (BMI 25.0-29.9)    3. Memory difficulties  -     Ambulatory Referral to Neurology  -     Vitamin B12; Future  -     RPR Qualitative with Reflex to Quant; Future    4. Adenocarcinoma distal esophagus stage IIIA  -     Cancel: Ambulatory Referral to Gastroenterology  -     Ambulatory Referral to Gastroenterology    5. History of esophageal cancer  -     Cancel: Ambulatory Referral to Gastroenterology  -     Ambulatory Referral to Gastroenterology    6. Loose stools  -     Cancel: Ambulatory Referral to Gastroenterology  -     Ambulatory Referral to Gastroenterology    7. Mixed hyperlipidemia  -     Comprehensive Metabolic Panel; Future  -     Lipid Panel; Future    8. Encounter for hepatitis C screening test for low risk patient  -     Hepatitis C Antibody; Future    9. Vitamin D deficiency  -     Vitamin D,25-Hydroxy; Future    10. Serum calcium elevated  -     Vitamin D,25-Hydroxy; Future    11. Gastroesophageal reflux disease, unspecified whether esophagitis present  -     famotidine (PEPCID) 40 MG tablet; Take 1 tablet by mouth Every Night.  Dispense: 30 tablet; Refill: 5  -     Ambulatory Referral to Gastroenterology    12. Screening for colon cancer  -     Ambulatory Referral to Gastroenterology        Assessment & Plan  1. Esophageal cancer.  - He has a history of esophageal cancer diagnosed in 2014 and treated with surgery in 2015. He has not had follow-up since 2016.  - A referral to a new gastroenterologist has been placed to continue monitoring his condition.  -As per previous referral he is advised to contact Dr. Flaherty, previous  oncologist directly using the provided number to schedule an appointment where he is currently [facility] through [facility]  - He reports no current reflux symptoms and uses Tums occasionally for heartburn.    2. Diarrhea.  - He experiences intermittent diarrhea and has been using colestipol powder as needed.  - He is advised to continue using colestipol intermittently if symptoms improve. If symptoms persist without medication, he may need to stay on it indefinitely until the underlying cause is identified.  - A referral to a gastroenterologist has been placed for further evaluation, including the possibility of an EGD and colonoscopy.  - He restarted colestipol recently and reports no current diarrhea.    3. Cholesterol management.  - He is currently taking cholesterol medication without any side effects.  - Labs will be checked in about a month to assess cholesterol levels and ensure they are within the target range.  - A lipid panel and CMP will be ordered to monitor liver function, a potential side effect of cholesterol medications.  - Previous labs from 03/2025 were within normal limits except for minimally elevated calcium.    4. Memory impairment.  - His memory test results were suboptimal, raising concerns about potential dementia.  - Additional tests, including an MRI of the brain and a vitamin B12 level check, will be ordered.  - A referral to a neurologist in [place] has been placed for further evaluation.  - He reports no significant alcohol use and no follow-up from a previous [employer] exit exam.    5. Hypertension.  - His blood pressure readings today were 142/88, which are higher than the ideal range.  - He is advised to maintain physical activity, reduce salt intake, and engage in stress-reducing activities such as exercise. The DASH diet is recommended.  - Blood pressure will be rechecked in a month, and if it remains elevated, antihypertensive medication may be considered.  - He does not have a  blood pressure cuff at home.    6. Gastroesophageal reflux disease.  - He reports occasional heartburn and uses Tums as needed.  - A prescription for Pepcid (famotidine) has been sent to the pharmacy to be taken at bedtime or after dinner.  - If Pepcid is ineffective, a return to omeprazole or a similar medication will be considered.  - He previously stopped omeprazole due to concerns related to his esophageal cancer.    7. Varicose veins.  - He has a history of varicose veins and wears compression socks for management.  - He is advised to continue using compression socks when standing for extended periods.  - He reports standing for long periods at work and finds the compression socks helpful.    8. Health maintenance.  - He is due for a colonoscopy, with the last one performed on 11/12/2014.  - He is also due for a pneumonia vaccine. He is advised to check his insurance coverage for Prevnar 20 or Prevnar 21 and schedule the vaccination accordingly.  - Colon cancer screening and reflux management have been added to the gastroenterology referral.    Follow-up  The patient will follow up in 1 month.    The patient was counseled regarding nutrition, physical activity, healthy weight, injury prevention, immunizations and preventative health screenings.      Return in about 1 month (around 6/27/2025) for Recheck, cholesterol elevated BP  and memory and ensure referrals are scheduled.      Expected course, medications, and adverse effects discussed.    Call or return if worsening or persistent symptoms.   Hand hygiene was performed before and after exam.   The complete contents of the Assessment and Plan and Data / Lab Results as documented above have been reviewed and addressed by myself with the patient today as part of an ongoing evaluation / treatment plan.    If separate E/M service has been provided today it was significant, medically necessary, and separately identifiable.           Patient or patient  representative verbalized consent for the use of Ambient Listening during the visit with  Yun Hawk MD for chart documentation. 5/27/2025  22:30 EDT

## 2025-05-27 ENCOUNTER — OFFICE VISIT (OUTPATIENT)
Dept: FAMILY MEDICINE CLINIC | Facility: CLINIC | Age: 65
End: 2025-05-27
Payer: COMMERCIAL

## 2025-05-27 VITALS
BODY MASS INDEX: 27.7 KG/M2 | DIASTOLIC BLOOD PRESSURE: 88 MMHG | OXYGEN SATURATION: 98 % | HEIGHT: 73 IN | HEART RATE: 82 BPM | SYSTOLIC BLOOD PRESSURE: 142 MMHG | RESPIRATION RATE: 18 BRPM | WEIGHT: 209 LBS | TEMPERATURE: 98.2 F

## 2025-05-27 DIAGNOSIS — E55.9 VITAMIN D DEFICIENCY: ICD-10-CM

## 2025-05-27 DIAGNOSIS — R19.5 LOOSE STOOLS: ICD-10-CM

## 2025-05-27 DIAGNOSIS — Z11.59 ENCOUNTER FOR HEPATITIS C SCREENING TEST FOR LOW RISK PATIENT: ICD-10-CM

## 2025-05-27 DIAGNOSIS — C15.9 ADENOCARCINOMA OF ESOPHAGUS: Chronic | ICD-10-CM

## 2025-05-27 DIAGNOSIS — R41.3 MEMORY DIFFICULTIES: ICD-10-CM

## 2025-05-27 DIAGNOSIS — E78.2 MIXED HYPERLIPIDEMIA: ICD-10-CM

## 2025-05-27 DIAGNOSIS — K21.9 GASTROESOPHAGEAL REFLUX DISEASE, UNSPECIFIED WHETHER ESOPHAGITIS PRESENT: ICD-10-CM

## 2025-05-27 DIAGNOSIS — E66.3 OVERWEIGHT (BMI 25.0-29.9): ICD-10-CM

## 2025-05-27 DIAGNOSIS — Z00.00 ANNUAL PHYSICAL EXAM: Primary | ICD-10-CM

## 2025-05-27 DIAGNOSIS — E83.52 SERUM CALCIUM ELEVATED: ICD-10-CM

## 2025-05-27 DIAGNOSIS — Z12.11 SCREENING FOR COLON CANCER: ICD-10-CM

## 2025-05-27 DIAGNOSIS — Z85.01 HISTORY OF ESOPHAGEAL CANCER: ICD-10-CM

## 2025-05-27 PROCEDURE — 99214 OFFICE O/P EST MOD 30 MIN: CPT | Performed by: FAMILY MEDICINE

## 2025-05-27 PROCEDURE — 99396 PREV VISIT EST AGE 40-64: CPT | Performed by: FAMILY MEDICINE

## 2025-05-27 RX ORDER — FAMOTIDINE 40 MG/1
40 TABLET, FILM COATED ORAL NIGHTLY
Qty: 30 TABLET | Refills: 5 | Status: SHIPPED | OUTPATIENT
Start: 2025-05-27

## 2025-06-20 ENCOUNTER — TELEPHONE (OUTPATIENT)
Dept: GASTROENTEROLOGY | Facility: CLINIC | Age: 65
End: 2025-06-20

## 2025-06-20 ENCOUNTER — OFFICE VISIT (OUTPATIENT)
Dept: GASTROENTEROLOGY | Facility: CLINIC | Age: 65
End: 2025-06-20
Payer: COMMERCIAL

## 2025-06-20 VITALS
DIASTOLIC BLOOD PRESSURE: 76 MMHG | HEIGHT: 73 IN | WEIGHT: 208.7 LBS | TEMPERATURE: 97.6 F | SYSTOLIC BLOOD PRESSURE: 126 MMHG | BODY MASS INDEX: 27.66 KG/M2 | HEART RATE: 85 BPM

## 2025-06-20 DIAGNOSIS — C15.9 ADENOCARCINOMA OF ESOPHAGUS: Primary | Chronic | ICD-10-CM

## 2025-06-20 DIAGNOSIS — Z12.11 SCREENING FOR COLON CANCER: ICD-10-CM

## 2025-06-20 DIAGNOSIS — R19.7 DIARRHEA, UNSPECIFIED TYPE: ICD-10-CM

## 2025-06-20 RX ORDER — SODIUM CHLORIDE, SODIUM LACTATE, POTASSIUM CHLORIDE, CALCIUM CHLORIDE 600; 310; 30; 20 MG/100ML; MG/100ML; MG/100ML; MG/100ML
30 INJECTION, SOLUTION INTRAVENOUS CONTINUOUS
OUTPATIENT
Start: 2025-06-20 | End: 2025-06-21

## 2025-06-20 NOTE — PROGRESS NOTES
Chief Complaint   Patient presents with    Heartburn    Diarrhea     Subjective   HPI  Americo Reyes is a 65 y.o. male who presents today for new patient evaluation    Hx of esophageal CA s/p esophagectomy/gastrectomy 2015.  Here to re-establish GI care  No EGD since his surgery. Last colonoscopy 2014 normal.   Occasional diarrhea take colestipol as needed  Minimal GERD on pepcid      Objective   Vitals:    06/20/25 0958   BP: 126/76   Pulse: 85   Temp: 97.6 °F (36.4 °C)     Physical Exam  Vitals reviewed.   Constitutional:       Appearance: He is well-developed.   HENT:      Head: Normocephalic and atraumatic.   Neurological:      Mental Status: He is alert and oriented to person, place, and time.   Psychiatric:         Behavior: Behavior normal.         Thought Content: Thought content normal.         Judgment: Judgment normal.              Assessment & Plan   Assessment:     1. Adenocarcinoma distal esophagus stage IIIA    2. Diarrhea, unspecified type    3. Screening for colon cancer      Plan:   Schedule EGD given hx of esophageal CA  Schedule screening colonoscopy  Continue Pepcid 20mg/day            Chacorta Allen M.D.  [facility]  [address] [place] [address]  Office: [phone]

## 2025-06-20 NOTE — TELEPHONE ENCOUNTER
RONNIE Koch for COLONOSCOPY on  7/7/25  arrive at 1:00  . Gave  prep instructions to pt in office....miralax

## 2025-06-20 NOTE — H&P (VIEW-ONLY)
Chief Complaint   Patient presents with    Heartburn    Diarrhea     Subjective   HPI  Americo Reyes is a 65 y.o. male who presents today for new patient evaluation    Hx of esophageal CA s/p esophagectomy/gastrectomy 2015.  Here to re-establish GI care  No EGD since his surgery. Last colonoscopy 2014 normal.   Occasional diarrhea take colestipol as needed  Minimal GERD on pepcid      Objective   Vitals:    06/20/25 0958   BP: 126/76   Pulse: 85   Temp: 97.6 °F (36.4 °C)     Physical Exam  Vitals reviewed.   Constitutional:       Appearance: He is well-developed.   HENT:      Head: Normocephalic and atraumatic.   Neurological:      Mental Status: He is alert and oriented to person, place, and time.   Psychiatric:         Behavior: Behavior normal.         Thought Content: Thought content normal.         Judgment: Judgment normal.              Assessment & Plan   Assessment:     1. Adenocarcinoma distal esophagus stage IIIA    2. Diarrhea, unspecified type    3. Screening for colon cancer      Plan:   Schedule EGD given hx of esophageal CA  Schedule screening colonoscopy  Continue Pepcid 20mg/day            Chacorta Allen M.D.  [facility]  [address] [place] [address]  Office: [phone]

## 2025-06-27 NOTE — PROGRESS NOTES
Chief Complaint  Hyperlipidemia, Hypertension, and Memory Loss    History of Present Illness  Americo Reyes presents today for follow up on hyperlipidemia, hypertension, and memory loss     Hyperlipidemia  Patient is following a low cholesterol diet.   Currently is on statin therapy.  Patient reports is exercising.  Patient reports they are taking medications as prescribed and they are not having side effects.    Hypertension  Patient does not check blood pressure at home.   Patient denies  blurred vision, chest pain, dyspnea, headache, neck aches, orthopnea, palpitations, paroxysmal nocturnal dyspnea, peripheral edema, and tiredness/fatigue.    Memory Loss  Patient states he does not believe he has any memory issues.  Patient is scheduled to see neurology on 10/9/25.  Treatments tried include none.    History of Present Illness  The patient is a 65-year-old male here for follow-up on hyperlipidemia, hypertension, and memory loss.  He is accompanied by his wife.  He has a history of adenocarcinoma of the esophagus. He has since reestablished care, seeing Dr. Romeo Allen on 06/20/2025. He is scheduled for an EGD and colonoscopy on 07/07/2025. He was advised to continue Pepcid 20 mg daily. He has an appointment scheduled with neurology, Dr. Norris Esquivel, on 10/09/2025 for evaluation of memory impairment. He did not have labs as ordered at the previous appointment; reminded him to have labs including CMP, lipid panel, hepatitis C antibody, vitamin D, vitamin B12, and RPR. He is taking Lipitor 20 mg daily. Previously prescribed colestipol and niacin, which he reports he is not taking. He is taking a daily aspirin. He does not have a history of hypertension but has had an isolated elevated blood pressure reading in May of 142/88. Today's blood pressure is 98/64. He is not on blood pressure medication.     The patient has a history of adenocarcinoma of the esophagus but had stopped follow-up with GI. [facility]o [facility] on [facility]/20/2025. He is scheduled for an EGD and colonoscopy on 07/07/2025. He was advised to continue Pepcid 20 mg daily. His wife mentions that the GI doctor suggested consulting an oncologist, as his previous oncologist, Dr. Flaherty, has changed practices and [facility], and is nearing longterm. It has been a decade since his cancer diagnosis. He underwent a PET scan in 2022, which showed no abnormalities. At that time, he experienced weight loss, which was attributed to his job.    He does not have a history of hypertension but has had an isolated elevated blood pressure reading in May of 142/88. Today's blood pressure is 98/64. He is not on blood pressure medication. His wife notes that his blood pressure typically remains around 120/80.    He has an appointment scheduled with neurology, Dr. Esquivel, on 10/09/2025 for evaluation of memory impairment.    He is taking Lipitor 20 mg daily for hyperlipidemia. He was previously prescribed colestipol and niacin, which he reports he is not taking.    The patient experienced fainting episodes at work last year, resulting in head injuries. His wife suspects these episodes may be due to low blood pressure. His job involves physical activity. In June 2024, he experienced a fainting episode while seated, prompting his employer to call EMS. He underwent a cardiac evaluation at [facility], which included an EKG and other tests, all of which were normal. He was not admitted overnight. His wife believes dehydration may be a contributing factor to his fainting episodes. He consumes a significant amount of fluids, primarily tea and coffee, and does not consume alcohol.    SOCIAL HISTORY  He does not drink alcohol.    FAMILY HISTORY  His family has a history of strokes.      Patient Care Team:  Yun Hawk MD as PCP - General (Family Medicine)   Current Outpatient Medications on File Prior to Visit  "  Medication Sig    aspirin 81 MG tablet ASPIRIN 81 MG ORAL TABLET    atorvastatin (LIPITOR) 20 MG tablet Take 1 tablet by mouth Every Night.    famotidine (PEPCID) 40 MG tablet Take 1 tablet by mouth Every Night.    colestipol (Colestid) 5 g granules Take 5 g by mouth 2 (Two) Times a Day.    niacin (NIACIN SR,NIASPAN ER) 500 MG CR capsule Take 1 capsule by mouth Daily.    pantoprazole (PROTONIX) 40 MG EC tablet Take 1 tablet by mouth Daily.     No current facility-administered medications on file prior to visit.       Objective   Vital Signs:   BP 98/64 (BP Location: Right arm, Patient Position: Sitting, Cuff Size: Large Adult)   Pulse 86   Temp 98.2 °F (36.8 °C) (Temporal)   Resp 18   Ht 182.9 cm (72\")   Wt 95 kg (209 lb 6.4 oz)   SpO2 96%   BMI 28.40 kg/m²    BP Readings from Last 3 Encounters:   07/07/25 109/80   06/30/25 98/64   06/20/25 126/76     Wt Readings from Last 3 Encounters:   07/07/25 91.7 kg (202 lb 3.2 oz)   06/30/25 95 kg (209 lb 6.4 oz)   06/20/25 94.7 kg (208 lb 11.2 oz)         Physical Exam  Vitals and nursing note reviewed.   Constitutional:       General: He is not in acute distress.     Appearance: Normal appearance. He is well-developed. He is not ill-appearing.   HENT:      Head: Normocephalic and atraumatic.   Eyes:      Conjunctiva/sclera: Conjunctivae normal.      Pupils: Pupils are equal, round, and reactive to light.   Neck:      Thyroid: No thyromegaly.      Vascular: No JVD.      Comments: No thyromegaly or palpable thyroid masses  Cardiovascular:      Rate and Rhythm: Normal rate and regular rhythm.      Heart sounds: Normal heart sounds. No murmur heard.  Pulmonary:      Breath sounds: Normal breath sounds. No wheezing or rales.   Musculoskeletal:         General: Normal range of motion.      Cervical back: Normal range of motion and neck supple. No tenderness.      Right lower leg: No edema.      Left lower leg: No edema.   Lymphadenopathy:      Cervical: No cervical " adenopathy.   Skin:     General: Skin is warm and dry.      Findings: No rash.   Neurological:      Mental Status: He is alert and oriented to person, place, and time.   Psychiatric:         Mood and Affect: Mood normal.         Behavior: Behavior normal.      Comments: Memory impaired          Physical Exam        No visits with results within 1 Day(s) from this visit.   Latest known visit with results is:   Results Encounter on 06/16/2025   Component Date Value Ref Range Status    Glucose 07/01/2025 99  70 - 99 mg/dL Final    BUN 07/01/2025 18  8 - 27 mg/dL Final    Creatinine 07/01/2025 0.99  0.76 - 1.27 mg/dL Final    EGFR Result 07/01/2025 86  >59 mL/min/1.73 Final    BUN/Creatinine Ratio 07/01/2025 18  10 - 24 Final    Sodium 07/01/2025 139  134 - 144 mmol/L Final    Potassium 07/01/2025 5.0  3.5 - 5.2 mmol/L Final    Chloride 07/01/2025 101  96 - 106 mmol/L Final    Total CO2 07/01/2025 27  20 - 29 mmol/L Final    Calcium 07/01/2025 9.5  8.6 - 10.2 mg/dL Final    Total Protein 07/01/2025 6.4  6.0 - 8.5 g/dL Final    Albumin 07/01/2025 4.3  3.9 - 4.9 g/dL Final    Globulin 07/01/2025 2.1  1.5 - 4.5 g/dL Final    Total Bilirubin 07/01/2025 0.4  0.0 - 1.2 mg/dL Final    Alkaline Phosphatase 07/01/2025 89  44 - 121 IU/L Final    AST (SGOT) 07/01/2025 24  0 - 40 IU/L Final    ALT (SGPT) 07/01/2025 16  0 - 44 IU/L Final    Total Cholesterol 07/01/2025 151  100 - 199 mg/dL Final    Triglycerides 07/01/2025 149  0 - 149 mg/dL Final    HDL Cholesterol 07/01/2025 43  >39 mg/dL Final    VLDL Cholesterol Juanjo 07/01/2025 26  5 - 40 mg/dL Final    LDL Chol Calc (NIH) 07/01/2025 82  0 - 99 mg/dL Final    Hep C Virus Ab 07/01/2025 Non Reactive  Non Reactive Final    Comment: HCV antibody alone does not differentiate between previously  resolved infection and active infection. Equivocal and Reactive  HCV antibody results should be followed up with an HCV RNA test  to support the diagnosis of active HCV infection.      25  Hydroxy, Vitamin D 07/01/2025 33.6  30.0 - 100.0 ng/mL Final    Comment: Vitamin D deficiency has been defined by the [place] of  Medicine and an Endocrine Society practice guideline as a  level of serum 25-OH vitamin D less than 20 ng/mL (1,2).  The Endocrine Society went on to further define vitamin D  insufficiency as a level between 21 and 29 ng/mL (2).  1. IOM ([place] [employer]). 2010. Dietary reference     intakes for calcium and D. [place]: The     National Academies Press.  2. Juan MF, Neda PETERSON, Helen ROOT, et al.     Evaluation, treatment, and prevention of vitamin D     deficiency: an Endocrine Society clinical practice     guideline. JCEM. 2011 Jul; 96(7):1911-30.      Vitamin B-12 07/01/2025 412  232 - 1,245 pg/mL Final    RPR 07/01/2025 Non Reactive  Non Reactive Final       Lab Results   Component Value Date    CHLPL 151 07/01/2025    TRIG 149 07/01/2025    HDL 43 07/01/2025    LDL 82 07/01/2025     Lab Results   Component Value Date    TSH 2.740 03/25/2025     Lab Results   Component Value Date    GLUCOSE 99 07/01/2025    BUN 18 07/01/2025    CREATININE 0.99 07/01/2025    EGFRIFNONA 77 09/19/2019    EGFRIFAFRI >60 09/04/2018    BCR 18 07/01/2025    K 5.0 07/01/2025    CO2 27 07/01/2025    CALCIUM 9.5 07/01/2025    ALBUMIN 4.3 07/01/2025    AST 24 07/01/2025    ALT 16 07/01/2025     Lab Results   Component Value Date    WBC 4.2 03/25/2025    HGB 16.2 03/25/2025    HCT 49.6 03/25/2025    MCV 86 03/25/2025     03/25/2025             Results               Assessment and Plan    Diagnoses and all orders for this visit:    1. Mixed hyperlipidemia (Primary)    2. Blood pressure elevated without history of HTN    3. Memory difficulties    4. Overweight (BMI 25.0-29.9)        Assessment & Plan  1. Hyperlipidemia.  - Currently taking Lipitor 20 mg daily but not taking previously prescribed colestipol and niacin.  - Lipid panel has been ordered to monitor cholesterol levels.  -  Discussed the importance of medication adherence and monitoring lipid levels.  - Continue current medication regimen and follow up on lipid panel results.    2. Hypertension.  - No history of hypertension but had an isolated elevated blood pressure reading of 142/88 in 05/2025.  - Today's blood pressure is 98/64.  - Blood pressure readings are being monitored; no medication is currently prescribed.  - Advised to maintain adequate hydration and monitor blood pressure regularly.    3. Memory loss.  - Appointment scheduled with neurology, , on 10/09/2025 for evaluation.  - Labs including CMP, lipid panel, hepatitis C antibody, vitamin D, vitamin B12, and RPR have been ordered.  - Discussed the importance of completing lab work for a comprehensive evaluation.  - Follow up with neurology as scheduled and complete lab tests.    4. Adenocarcinoma of the esophagus.  - Reestablished care with Dr. Romeo Allen; scheduled for EGD and colonoscopy on 07/07/2025.  - Advised to continue Pepcid 20 mg daily.  - Referral to Dr. Brendan abdullahi for oncology follow-up.  - Continue with scheduled procedures and follow up with oncology as needed.    5. Syncope.  - Experienced episodes of syncope and collapse, including one in 06/2025.  Patient declines recommendation for cardiac workup at this time.  - Advised to maintain adequate hydration and avoid activities that could trigger syncope.  - If symptoms persist, a Holter monitor will be considered to rule out cardiac arrhythmia.  - Monitor symptoms and seek further evaluation if episodes continue.    Follow-up  The patient will follow up in 6 months.      There are no discontinued medications.      Follow Up     Return in about 6 months (around 12/30/2025).    Patient was given instructions and counseling regarding his condition or for health maintenance advice. Please see specific information pulled into the AVS if appropriate.     Patient or patient representative verbalized  consent for the use of Ambient Listening during the visit with  Yun Hawk MD for chart documentation. 7/14/2025  23:41 EDT

## 2025-06-30 ENCOUNTER — OFFICE VISIT (OUTPATIENT)
Dept: FAMILY MEDICINE CLINIC | Facility: CLINIC | Age: 65
End: 2025-06-30
Payer: COMMERCIAL

## 2025-06-30 VITALS
HEART RATE: 86 BPM | DIASTOLIC BLOOD PRESSURE: 64 MMHG | TEMPERATURE: 98.2 F | SYSTOLIC BLOOD PRESSURE: 98 MMHG | BODY MASS INDEX: 28.36 KG/M2 | RESPIRATION RATE: 18 BRPM | OXYGEN SATURATION: 96 % | WEIGHT: 209.4 LBS | HEIGHT: 72 IN

## 2025-06-30 DIAGNOSIS — R03.0 BLOOD PRESSURE ELEVATED WITHOUT HISTORY OF HTN: ICD-10-CM

## 2025-06-30 DIAGNOSIS — E78.2 MIXED HYPERLIPIDEMIA: Primary | ICD-10-CM

## 2025-06-30 DIAGNOSIS — E66.3 OVERWEIGHT (BMI 25.0-29.9): ICD-10-CM

## 2025-06-30 DIAGNOSIS — R41.3 MEMORY DIFFICULTIES: ICD-10-CM

## 2025-06-30 PROCEDURE — 99214 OFFICE O/P EST MOD 30 MIN: CPT | Performed by: FAMILY MEDICINE

## 2025-07-07 ENCOUNTER — HOSPITAL ENCOUNTER (OUTPATIENT)
Facility: HOSPITAL | Age: 65
Setting detail: HOSPITAL OUTPATIENT SURGERY
Discharge: HOME | End: 2025-07-07
Attending: INTERNAL MEDICINE | Admitting: INTERNAL MEDICINE
Payer: COMMERCIAL

## 2025-07-07 ENCOUNTER — ANESTHESIA EVENT (OUTPATIENT)
Dept: GASTROENTEROLOGY | Facility: HOSPITAL | Age: 65
End: 2025-07-07
Payer: COMMERCIAL

## 2025-07-07 ENCOUNTER — ANESTHESIA (OUTPATIENT)
Dept: GASTROENTEROLOGY | Facility: HOSPITAL | Age: 65
End: 2025-07-07
Payer: COMMERCIAL

## 2025-07-07 VITALS
HEIGHT: 72 IN | RESPIRATION RATE: 16 BRPM | BODY MASS INDEX: 27.39 KG/M2 | SYSTOLIC BLOOD PRESSURE: 109 MMHG | HEART RATE: 57 BPM | DIASTOLIC BLOOD PRESSURE: 80 MMHG | OXYGEN SATURATION: 95 % | WEIGHT: 202.2 LBS | TEMPERATURE: 97.7 F

## 2025-07-07 DIAGNOSIS — C15.9 ADENOCARCINOMA OF ESOPHAGUS: Chronic | ICD-10-CM

## 2025-07-07 DIAGNOSIS — C15.9 ADENOCARCINOMA OF ESOPHAGUS: ICD-10-CM

## 2025-07-07 DIAGNOSIS — R19.7 DIARRHEA, UNSPECIFIED TYPE: ICD-10-CM

## 2025-07-07 PROCEDURE — 25010000002 SUCCINYLCHOLINE PER 20 MG: Performed by: NURSE ANESTHETIST, CERTIFIED REGISTERED

## 2025-07-07 PROCEDURE — 25010000002 PROPOFOL 10 MG/ML EMULSION: Performed by: NURSE ANESTHETIST, CERTIFIED REGISTERED

## 2025-07-07 PROCEDURE — 25810000003 LACTATED RINGERS PER 1000 ML: Performed by: INTERNAL MEDICINE

## 2025-07-07 PROCEDURE — 88305 TISSUE EXAM BY PATHOLOGIST: CPT | Performed by: INTERNAL MEDICINE

## 2025-07-07 PROCEDURE — 25010000002 LIDOCAINE 2% SOLUTION: Performed by: NURSE ANESTHETIST, CERTIFIED REGISTERED

## 2025-07-07 RX ORDER — HYDROMORPHONE HYDROCHLORIDE 1 MG/ML
0.25 INJECTION, SOLUTION INTRAMUSCULAR; INTRAVENOUS; SUBCUTANEOUS
Refills: 0 | Status: DISCONTINUED | OUTPATIENT
Start: 2025-07-07 | End: 2025-07-07 | Stop reason: HOSPADM

## 2025-07-07 RX ORDER — ATROPINE SULFATE 0.4 MG/ML
0.4 INJECTION, SOLUTION INTRAMUSCULAR; INTRAVENOUS; SUBCUTANEOUS ONCE AS NEEDED
Status: DISCONTINUED | OUTPATIENT
Start: 2025-07-07 | End: 2025-07-07 | Stop reason: HOSPADM

## 2025-07-07 RX ORDER — HYDROCODONE BITARTRATE AND ACETAMINOPHEN 7.5; 325 MG/1; MG/1
1 TABLET ORAL EVERY 4 HOURS PRN
Refills: 0 | Status: DISCONTINUED | OUTPATIENT
Start: 2025-07-07 | End: 2025-07-07 | Stop reason: HOSPADM

## 2025-07-07 RX ORDER — EPHEDRINE SULFATE 50 MG/ML
5 INJECTION, SOLUTION INTRAVENOUS ONCE AS NEEDED
Status: DISCONTINUED | OUTPATIENT
Start: 2025-07-07 | End: 2025-07-07 | Stop reason: HOSPADM

## 2025-07-07 RX ORDER — SODIUM CHLORIDE, SODIUM LACTATE, POTASSIUM CHLORIDE, CALCIUM CHLORIDE 600; 310; 30; 20 MG/100ML; MG/100ML; MG/100ML; MG/100ML
30 INJECTION, SOLUTION INTRAVENOUS CONTINUOUS PRN
Status: DISCONTINUED | OUTPATIENT
Start: 2025-07-07 | End: 2025-07-07 | Stop reason: HOSPADM

## 2025-07-07 RX ORDER — FLUMAZENIL 0.1 MG/ML
0.2 INJECTION INTRAVENOUS AS NEEDED
Status: DISCONTINUED | OUTPATIENT
Start: 2025-07-07 | End: 2025-07-07 | Stop reason: HOSPADM

## 2025-07-07 RX ORDER — SODIUM CHLORIDE, SODIUM LACTATE, POTASSIUM CHLORIDE, CALCIUM CHLORIDE 600; 310; 30; 20 MG/100ML; MG/100ML; MG/100ML; MG/100ML
30 INJECTION, SOLUTION INTRAVENOUS CONTINUOUS
Status: DISCONTINUED | OUTPATIENT
Start: 2025-07-07 | End: 2025-07-07

## 2025-07-07 RX ORDER — DIPHENHYDRAMINE HYDROCHLORIDE 50 MG/ML
12.5 INJECTION, SOLUTION INTRAMUSCULAR; INTRAVENOUS
Status: DISCONTINUED | OUTPATIENT
Start: 2025-07-07 | End: 2025-07-07 | Stop reason: HOSPADM

## 2025-07-07 RX ORDER — DROPERIDOL 2.5 MG/ML
0.62 INJECTION, SOLUTION INTRAMUSCULAR; INTRAVENOUS
Status: DISCONTINUED | OUTPATIENT
Start: 2025-07-07 | End: 2025-07-07 | Stop reason: HOSPADM

## 2025-07-07 RX ORDER — ONDANSETRON 2 MG/ML
4 INJECTION INTRAMUSCULAR; INTRAVENOUS ONCE AS NEEDED
Status: DISCONTINUED | OUTPATIENT
Start: 2025-07-07 | End: 2025-07-07 | Stop reason: HOSPADM

## 2025-07-07 RX ORDER — FENTANYL CITRATE 50 UG/ML
25 INJECTION, SOLUTION INTRAMUSCULAR; INTRAVENOUS
Status: DISCONTINUED | OUTPATIENT
Start: 2025-07-07 | End: 2025-07-07 | Stop reason: HOSPADM

## 2025-07-07 RX ORDER — SUCCINYLCHOLINE CHLORIDE 20 MG/ML
INJECTION INTRAMUSCULAR; INTRAVENOUS AS NEEDED
Status: DISCONTINUED | OUTPATIENT
Start: 2025-07-07 | End: 2025-07-07 | Stop reason: SURG

## 2025-07-07 RX ORDER — LIDOCAINE HYDROCHLORIDE 20 MG/ML
INJECTION, SOLUTION INFILTRATION; PERINEURAL AS NEEDED
Status: DISCONTINUED | OUTPATIENT
Start: 2025-07-07 | End: 2025-07-07 | Stop reason: SURG

## 2025-07-07 RX ORDER — HYDROCODONE BITARTRATE AND ACETAMINOPHEN 5; 325 MG/1; MG/1
1 TABLET ORAL ONCE AS NEEDED
Refills: 0 | Status: DISCONTINUED | OUTPATIENT
Start: 2025-07-07 | End: 2025-07-07 | Stop reason: HOSPADM

## 2025-07-07 RX ORDER — NALOXONE HCL 0.4 MG/ML
0.2 VIAL (ML) INJECTION AS NEEDED
Status: DISCONTINUED | OUTPATIENT
Start: 2025-07-07 | End: 2025-07-07 | Stop reason: HOSPADM

## 2025-07-07 RX ORDER — HYDRALAZINE HYDROCHLORIDE 20 MG/ML
5 INJECTION INTRAMUSCULAR; INTRAVENOUS
Status: DISCONTINUED | OUTPATIENT
Start: 2025-07-07 | End: 2025-07-07 | Stop reason: HOSPADM

## 2025-07-07 RX ORDER — PANTOPRAZOLE SODIUM 40 MG/1
40 TABLET, DELAYED RELEASE ORAL DAILY
Qty: 30 TABLET | Refills: 5 | Status: SHIPPED | OUTPATIENT
Start: 2025-07-07

## 2025-07-07 RX ORDER — PROMETHAZINE HYDROCHLORIDE 25 MG/1
25 SUPPOSITORY RECTAL ONCE AS NEEDED
Status: DISCONTINUED | OUTPATIENT
Start: 2025-07-07 | End: 2025-07-07 | Stop reason: HOSPADM

## 2025-07-07 RX ORDER — LABETALOL HYDROCHLORIDE 5 MG/ML
5 INJECTION, SOLUTION INTRAVENOUS
Status: DISCONTINUED | OUTPATIENT
Start: 2025-07-07 | End: 2025-07-07 | Stop reason: HOSPADM

## 2025-07-07 RX ORDER — PROMETHAZINE HYDROCHLORIDE 25 MG/1
25 TABLET ORAL ONCE AS NEEDED
Status: DISCONTINUED | OUTPATIENT
Start: 2025-07-07 | End: 2025-07-07 | Stop reason: HOSPADM

## 2025-07-07 RX ORDER — IPRATROPIUM BROMIDE AND ALBUTEROL SULFATE 2.5; .5 MG/3ML; MG/3ML
3 SOLUTION RESPIRATORY (INHALATION) ONCE AS NEEDED
Status: DISCONTINUED | OUTPATIENT
Start: 2025-07-07 | End: 2025-07-07 | Stop reason: HOSPADM

## 2025-07-07 RX ORDER — PROPOFOL 10 MG/ML
VIAL (ML) INTRAVENOUS AS NEEDED
Status: DISCONTINUED | OUTPATIENT
Start: 2025-07-07 | End: 2025-07-07 | Stop reason: SURG

## 2025-07-07 RX ADMIN — SUCCINYLCHOLINE CHLORIDE 140 MG: 20 INJECTION, SOLUTION INTRAMUSCULAR; INTRAVENOUS; PARENTERAL at 14:53

## 2025-07-07 RX ADMIN — PROPOFOL 200 MG: 10 INJECTION, EMULSION INTRAVENOUS at 14:53

## 2025-07-07 RX ADMIN — SODIUM CHLORIDE, POTASSIUM CHLORIDE, SODIUM LACTATE AND CALCIUM CHLORIDE: 600; 310; 30; 20 INJECTION, SOLUTION INTRAVENOUS at 15:17

## 2025-07-07 RX ADMIN — LIDOCAINE HYDROCHLORIDE 80 MG: 20 INJECTION, SOLUTION INFILTRATION; PERINEURAL at 14:53

## 2025-07-07 RX ADMIN — SODIUM CHLORIDE, POTASSIUM CHLORIDE, SODIUM LACTATE AND CALCIUM CHLORIDE 30 ML/HR: 600; 310; 30; 20 INJECTION, SOLUTION INTRAVENOUS at 14:22

## 2025-07-07 RX ADMIN — PROPOFOL 180 MCG/KG/MIN: 10 INJECTION, EMULSION INTRAVENOUS at 14:53

## 2025-07-07 NOTE — ANESTHESIA PREPROCEDURE EVALUATION
Anesthesia Evaluation     Patient summary reviewed and Nursing notes reviewed                Airway   Mallampati: II  Dental      Pulmonary - negative pulmonary ROS   Cardiovascular     Rhythm: regular  Rate: normal    (+) hyperlipidemia      Neuro/Psych- negative ROS  GI/Hepatic/Renal/Endo    (+) obesity, GERD    Musculoskeletal (-) negative ROS    Abdominal    Substance History - negative use     OB/GYN negative ob/gyn ROS         Other                    Anesthesia Plan    ASA 2     general     (GETA secondary to aspiration risk after esophagectomy despite NPO status )  intravenous induction     Anesthetic plan, risks, benefits, and alternatives have been provided, discussed and informed consent has been obtained with: patient.    CODE STATUS:

## 2025-07-07 NOTE — ANESTHESIA PROCEDURE NOTES
Airway  Reason: elective    Date/Time: 7/7/2025 2:54 PM  Airway not difficult    General Information and Staff    Patient location: endo.  Anesthesiologist: Sergio Fowler MD  CRNA/CAA: Anali Duran CRNA    Indications and Patient Condition  Indications for airway management: airway protection    Preoxygenated: yes    Mask difficulty assessment: 0 - not attempted    Final Airway Details    Final airway type: endotracheal airway      Successful airway: ETT  Cuffed: yes   Successful intubation technique: direct laryngoscopy and RSI  Adjuncts used in placement: cricoid pressure and intubating stylet  Endotracheal tube insertion site: oral  Blade: Mago  Blade size: 4  ETT size (mm): 7.5  Cormack-Lehane Classification: grade IIb - view of arytenoids or posterior of glottis only  Placement verified by: chest auscultation and capnometry   Cuff volume (mL): 6  Measured from: teeth  ETT/EBT  to teeth (cm): 23  Number of attempts at approach: 1  Assessment: lips, teeth, and gum same as pre-op and atraumatic intubation

## 2025-07-07 NOTE — DISCHARGE INSTRUCTIONS
For the rest of the day patient needs to be with a responsible adult.    For the rest of the day DO NOT work, drive, operate heavy machinery, drink alcohol, make important decisions or sign legal documents.    Advance to regular diet as tolerated, if your stomach is upset start with a light/bland diet.    Follow recommendations on procedure report if provided by your doctor.    Call Dr Allen for problems [phone]    If these problems occur come to ER: large amounts of bleeding, trouble breathing, repeated vomiting, severe unrelieved pain, fever or chills.

## 2025-07-07 NOTE — ANESTHESIA POSTPROCEDURE EVALUATION
Patient: Americo Reyes    Procedure Summary       Date: 07/07/25 Room / Location: [facility] ENDOSCOPY 10 / [facility] ENDOSCOPY    Anesthesia Start: 1448 Anesthesia Stop: 1538    Procedures:       ESOPHAGOGASTRODUODENOSCOPY with biopsies (Esophagus)      COLONOSCOPY into cecum Diagnosis:       Adenocarcinoma of esophagus      Diarrhea, unspecified type      (Adenocarcinoma of esophagus [C15.9])      (Diarrhea, unspecified type [R19.7])    Surgeons: Chacorta Allen MD Provider: Sergio Fowler MD    Anesthesia Type: general ASA Status: 2            Anesthesia Type: general    Vitals  Vitals Value Taken Time   /80 07/07/25 15:55   Temp 36.5 °C (97.7 °F) 07/07/25 15:45   Pulse 57 07/07/25 15:55   Resp 16 07/07/25 15:55   SpO2 95 % 07/07/25 15:55           Post Anesthesia Care and Evaluation    Patient location during evaluation: PACU  Patient participation: complete - patient participated  Level of consciousness: awake and alert  Pain management: adequate    Airway patency: patent  Anesthetic complications: No anesthetic complications    Cardiovascular status: acceptable  Respiratory status: acceptable  Hydration status: acceptable    Comments: --------------------            07/07/25               1555     --------------------   BP:       109/80     Pulse:      57       Resp:       16       Temp:                SpO2:      95%      --------------------

## 2025-07-09 LAB
CYTO UR: NORMAL
LAB AP CASE REPORT: NORMAL
PATH REPORT.FINAL DX SPEC: NORMAL
PATH REPORT.GROSS SPEC: NORMAL

## 2025-08-05 ENCOUNTER — TELEPHONE (OUTPATIENT)
Dept: GASTROENTEROLOGY | Facility: CLINIC | Age: 65
End: 2025-08-05
Payer: COMMERCIAL

## (undated) DEVICE — TUBING, SUCTION, 1/4" X 10', STRAIGHT: Brand: MEDLINE

## (undated) DEVICE — BLCK/BITE BLOX W/DENTL/RIM W/STRAP 54F

## (undated) DEVICE — SENSR O2 OXIMAX FNGR A/ 18IN NONSTR

## (undated) DEVICE — ADAPT CLN BIOGUARD AIR/H2O DISP

## (undated) DEVICE — SINGLE-USE BIOPSY FORCEPS: Brand: RADIAL JAW 4

## (undated) DEVICE — LN SMPL CO2 SHTRM SD STREAM W/M LUER

## (undated) DEVICE — KT ORCA ORCAPOD DISP STRL